# Patient Record
Sex: FEMALE | Race: WHITE | Employment: FULL TIME | URBAN - METROPOLITAN AREA
[De-identification: names, ages, dates, MRNs, and addresses within clinical notes are randomized per-mention and may not be internally consistent; named-entity substitution may affect disease eponyms.]

---

## 2019-02-26 ENCOUNTER — HOSPITAL ENCOUNTER (OUTPATIENT)
Age: 54
Discharge: HOME | End: 2019-02-26
Payer: COMMERCIAL

## 2019-02-26 ENCOUNTER — HOSPITAL ENCOUNTER (OUTPATIENT)
Dept: GENERAL RADIOLOGY | Age: 54
Discharge: HOME | End: 2019-02-26
Payer: COMMERCIAL

## 2019-02-26 DIAGNOSIS — M25.512 LEFT SHOULDER PAIN, UNSPECIFIED CHRONICITY: ICD-10-CM

## 2019-02-26 PROCEDURE — 73030 X-RAY EXAM OF SHOULDER: CPT

## 2019-04-10 ENCOUNTER — HOSPITAL ENCOUNTER (OUTPATIENT)
Dept: MRI IMAGING | Age: 54
Discharge: HOME | End: 2019-04-10
Payer: COMMERCIAL

## 2019-04-10 DIAGNOSIS — R29.898 LEFT HAND WEAKNESS: ICD-10-CM

## 2019-04-10 PROCEDURE — 73221 MRI JOINT UPR EXTREM W/O DYE: CPT

## 2019-04-15 ENCOUNTER — OFFICE VISIT (OUTPATIENT)
Dept: ORTHOPEDIC SURGERY | Age: 54
End: 2019-04-15
Payer: COMMERCIAL

## 2019-04-15 VITALS
DIASTOLIC BLOOD PRESSURE: 83 MMHG | HEART RATE: 85 BPM | SYSTOLIC BLOOD PRESSURE: 123 MMHG | HEIGHT: 66 IN | BODY MASS INDEX: 29.73 KG/M2 | WEIGHT: 184.97 LBS

## 2019-04-15 DIAGNOSIS — M75.82 TENDINITIS OF LEFT ROTATOR CUFF: ICD-10-CM

## 2019-04-15 DIAGNOSIS — M79.18 CERVICAL MYOFASCIAL PAIN SYNDROME: ICD-10-CM

## 2019-04-15 DIAGNOSIS — M25.512 ACUTE PAIN OF LEFT SHOULDER: ICD-10-CM

## 2019-04-15 DIAGNOSIS — M54.2 NECK PAIN: Primary | ICD-10-CM

## 2019-04-15 DIAGNOSIS — R20.0 LEFT ARM NUMBNESS: ICD-10-CM

## 2019-04-15 DIAGNOSIS — M54.2 CERVICAL SPINE PAIN: ICD-10-CM

## 2019-04-15 PROCEDURE — 20610 DRAIN/INJ JOINT/BURSA W/O US: CPT | Performed by: FAMILY MEDICINE

## 2019-04-15 PROCEDURE — 99203 OFFICE O/P NEW LOW 30 MIN: CPT | Performed by: FAMILY MEDICINE

## 2019-04-15 PROCEDURE — G8427 DOCREV CUR MEDS BY ELIG CLIN: HCPCS | Performed by: FAMILY MEDICINE

## 2019-04-15 PROCEDURE — 3017F COLORECTAL CA SCREEN DOC REV: CPT | Performed by: FAMILY MEDICINE

## 2019-04-15 PROCEDURE — 1036F TOBACCO NON-USER: CPT | Performed by: FAMILY MEDICINE

## 2019-04-15 PROCEDURE — G8419 CALC BMI OUT NRM PARAM NOF/U: HCPCS | Performed by: FAMILY MEDICINE

## 2019-04-15 RX ORDER — OXYBUTYNIN CHLORIDE 5 MG/1
TABLET ORAL
COMMUNITY
Start: 2019-03-04

## 2019-04-15 RX ORDER — DICLOFENAC SODIUM 75 MG/1
75 TABLET, DELAYED RELEASE ORAL 2 TIMES DAILY WITH MEALS
Qty: 60 TABLET | Refills: 3 | Status: SHIPPED | OUTPATIENT
Start: 2019-04-15

## 2019-04-15 RX ORDER — BETAMETHASONE SODIUM PHOSPHATE AND BETAMETHASONE ACETATE 3; 3 MG/ML; MG/ML
12 INJECTION, SUSPENSION INTRA-ARTICULAR; INTRALESIONAL; INTRAMUSCULAR; SOFT TISSUE ONCE
Status: COMPLETED | OUTPATIENT
Start: 2019-04-15 | End: 2019-04-15

## 2019-04-15 RX ORDER — TRAMADOL HYDROCHLORIDE 50 MG/1
50 TABLET ORAL EVERY 6 HOURS PRN
Qty: 28 TABLET | Refills: 0 | Status: SHIPPED | OUTPATIENT
Start: 2019-04-15 | End: 2019-04-22

## 2019-04-15 RX ORDER — METHYLPREDNISOLONE 4 MG/1
TABLET ORAL
Qty: 21 KIT | Refills: 0 | Status: SHIPPED | OUTPATIENT
Start: 2019-04-15 | End: 2019-05-16 | Stop reason: ALTCHOICE

## 2019-04-15 RX ORDER — CYCLOBENZAPRINE HCL 5 MG
TABLET ORAL
COMMUNITY
Start: 2019-02-26

## 2019-04-15 RX ADMIN — BETAMETHASONE SODIUM PHOSPHATE AND BETAMETHASONE ACETATE 12 MG: 3; 3 INJECTION, SUSPENSION INTRA-ARTICULAR; INTRALESIONAL; INTRAMUSCULAR; SOFT TISSUE at 13:45

## 2019-04-15 NOTE — PROGRESS NOTES
Chief Complaint  Shoulder Pain (NP LEFT SHOULDER)      History of Present Illness:  Kyle Su is a 52 y.o. female ***     New Patient:       Patient is being seen today for {OSE ACUTE CHRONIC:} {body parts:114601} pain. The patient reports on ***. Patient reports pain as {Improving/worsening/no change:00759}. Currently pain is a {NUMBER 1-10:5320531244} out of 10 on the pain scale. This problem has been an issue for {#:35071} {DAYS:}. The problem is worse {Time; morning/afternoon/evenin} or is constant or is associated with ***. Patient has other associated symptoms: ***. Patient has attempted {BACK PAIN PREVIOUS TREATMENT:} to treat this problem and symptoms are {Improving/worsening/no change:37428}. Patient {HAS/DOES NOT HAVE:} a previous history ***.               Past Medical History:   Diagnosis Date    Acute UTI        Past Surgical History:   Procedure Laterality Date    ECTOPIC PREGNANCY SURGERY      TONSILLECTOMY         Social History     Socioeconomic History    Marital status: Single     Spouse name: Not on file    Number of children: Not on file    Years of education: Not on file    Highest education level: Not on file   Occupational History    Not on file   Social Needs    Financial resource strain: Not on file    Food insecurity:     Worry: Not on file     Inability: Not on file    Transportation needs:     Medical: Not on file     Non-medical: Not on file   Tobacco Use    Smoking status: Never Smoker    Smokeless tobacco: Never Used   Substance and Sexual Activity    Alcohol use: No    Drug use: No    Sexual activity: Not on file   Lifestyle    Physical activity:     Days per week: Not on file     Minutes per session: Not on file    Stress: Not on file   Relationships    Social connections:     Talks on phone: Not on file     Gets together: Not on file     Attends Holiness service: Not on file     Active member of club or organization: Not on file Attends meetings of clubs or organizations: Not on file     Relationship status: Not on file    Intimate partner violence:     Fear of current or ex partner: Not on file     Emotionally abused: Not on file     Physically abused: Not on file     Forced sexual activity: Not on file   Other Topics Concern    Not on file   Social History Narrative    Not on file        Family History   Problem Relation Age of Onset    Cancer Father     Cancer Mother     Cancer Sister        Allergies   Allergen Reactions    Amoxicillin Hives    Penicillins Hives       Review of Systems  Pertinent items are noted in HPI  Review of systems reviewed from Patient History Form dated on *** and available in the patient's chart under the Media tab. Vital Signs     Ht 5' 5.98\" (1.676 m)   Wt 184 lb 15.5 oz (83.9 kg)   LMP 03/27/2013   BMI 29.87 kg/m²     Current Outpatient Medications   Medication Sig Dispense Refill    fluticasone (FLONASE) 50 MCG/ACT nasal spray 1 spray by Nasal route daily      albuterol sulfate  (90 Base) MCG/ACT inhaler Inhale 2 puffs into the lungs every 6 hours as needed for Wheezing      loratadine (CLARITIN) 10 MG tablet Take 1 tablet by mouth daily 30 tablet 0    tamsulosin (FLOMAX) 0.4 MG capsule Take 0.4 mg by mouth daily. No current facility-administered medications for this visit. General Exam:   Constitutional: Patient is adequately groomed with no evidence of malnutrition  DTRs: Deep tendon reflexes are intact  Mental Status: The patient is oriented to time, place and person. The patient's mood and affect are appropriate. Lymphatic: The lymphatic examination bilaterally reveals all areas to be without enlargement or induration. Vascular: Examination reveals no swelling or calf tenderness. Peripheral pulses are palpable and 2+. Neurological: The patient has good coordination. There is no weakness or sensory deficit.       *** Examination    Inspection: ***    Palpation:  ***    Rang of Motion:  ***    Strength:  ***    Special Tests:  ***    Skin: There are no rashes, ulcerations or lesions. Distal motor sensory and vascular exam is intact. Gait: Fluid smooth gait       Reflex symmetrically preserved      Additional Comments:             Additional Examinations:     {Additional Exams:37835}        Diagnostic Test Findings:    X-rays obtained and reviewed in office:  Views ***  Location  {RIGHT LEFT BILATERAL:18018}  ***  Impression ***    Assessment & Plan:    There are no diagnoses linked to this encounter. Treatment Plan:  Treatment options were discussed withBhumi Rodriguez. ***        This dictation was performed with a verbal recognition program (DRAGON) and it was checked for errors. It is possible that there are still dictated errors within this office note. If so, please bring any errors to my attention for an addendum. All efforts were made to ensure that this office note is accurate.

## 2019-04-15 NOTE — PATIENT INSTRUCTIONS
*If you're currently taking an anti-inflammatory such as Advil, Aleve, Ibuprofen, Diclofenac, Naproxen, Meloxicam, Celebrex, or Relafen, please stop. *Take Medrol as directed on foil side of packaging. *Once you are finished with the medrol, then you may re-start or start your anti-inflammatory (DICLOFENAC) the following day.

## 2019-04-15 NOTE — PROGRESS NOTES
Chief Complaint    Shoulder Pain (NP LEFT SHOULDER) and Neck Pain    Initial consultation left shoulder and cervical pain with left arm achiness with numbness    History of Present Illness:  Aracelis Love is a 52 y.o. 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 Davion Anders who is being seen today upon referral from Alonso Acharya for evaluation of ongoing left shoulder and cervical pain with left arm achiness with numbness and tingling. She states that she has been having the initial onset of pain in early February 2019 Initially to her left sided parascapular region which has since involved the left side of her cervical spine left shoulder and arm. 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 She is no longer working at the . She describes a throbbing aching pain in her shoulder at rest at 3-4 out of 10 with more sharp 7-8/10 pain with active shoulder elevation. She is also having numbness and tingling in the C6 distribution of her left arm which has been present since about mid March 2019. She was evaluated by Alonso Acharya CNP and sent for an MRI of her left shoulder which was obtained at [facility] on 4/10/2019 and showed mild glenohumeral osteoarthritis with mild insertional supraspinatus tendinosis. This did not respond to oral anti-inflammatories with Naprosyn or several sessions of therapy although she has only had 3 sessions of PT and is unable to perform her home-based exercises. She is also having difficulty sleeping with left arm numbness and tingling in this have soreness about the cervical spine as well. She is not it had cervical imaging and most for therapy did not involve her cervical spine. She is being seen today for orthopedic and sports consultation with review of her imaging.       Medical History  Patient's medications, allergies, past medical, surgical, social and family histories were reviewed and updated as appropriate. Review of Systems  Relevant review of systems reviewed on 4/15/2019 and available in the patient's chart under the medial tab. Vital Signs  Vitals:    04/15/19 1259   BP: 123/83   Pulse: 85         General Exam:   Constitutional: Patient is adequately groomed with no evidence of malnutrition  DTRs: Deep tendon reflexes are intact  Mental Status: The patient is oriented to time, place and person. The patient's mood and affect are appropriate. Lymphatic: The lymphatic examination bilaterally reveals all areas to be without enlargement or induration. Vascular: Examination reveals no swelling or calf tenderness. Peripheral pulses are palpable and 2+. Neurological: The patient has good coordination. There is no weakness or sensory deficit. Shoulder Examination    Inspection:  There is no high-grade deformity atrophy or soft tissue swelling. She does have some mild a.c. crepitation. Palpation:  She does have tenderness along the posterior cuff greater tuberosity a.c. joint and biceps tendon. Rang of Motion:  He does have very restricted shoulder motion. She'll only abduct to about 60 and forward flex to about 70. External rotation to 20 and internal rotation is just better than hip pocket. Strength:  She does have give way type weakness 4-5 with supra and infraspinatus testing with pain reproduced at 7 out of 10. Subscap slightly better at 4-4+ out of 5. Special Tests:  She has negative drop testing. Above-mentioned pain with supra and infraspinatus testing. I do not sense high-grade instability although she is very stiff and guarded. Negative speed's and labral testing. She does have pain with axial load testing and an equivocal left-sided Spurling's. Skin: There are no rashes, ulcerations or lesions.   Distal motor sensory and vascular exam is intact. Gait: Fluid smooth gait. Reflexes:  Symmetrically preserved. Additional Comments: Cervical spine evaluation does reveal limitations in cervical motion. She does have pain with flexion beyond 60 and extension of -10. Definite pain with axial loading and questionably positive left Spurling's. I do not sense a focal upper extremity motor deficits. DTRs relatively preserved. Equivocal Tinel's carpal tunnel left wrist.  Negative cubital tunnels. Additional Examinations:  Contralateral Exam: Examination of the right shoulder reveals no atrophy or deformity. The skin is warm and dry. Range of motion is within normal limits. There is no focal tenderness with palpation. No AC joint tenderness. Negative Neer's and Palacio-Fitz exams. Strength is graded 5/5 throughout. Right Upper Extremity:  Examination of the right upper extremity does not show any tenderness, deformity or injury. Range of motion is unremarkable. There is no gross instability. There are no rashes, ulcerations or lesions. Strength and tone are normal.  Left Upper Extremity: Examination of the left upper extremity does not show any tenderness, deformity or injury. Range of motion is unremarkable. There is no gross instability. There are no rashes, ulcerations or lesions.   Strength and tone are normal.         Diagnostic Test Findings:   Left shoulder MRI obtained 4/10/2019 as listed above     Narrative   EXAMINATION:   MRI OF THE LEFT SHOULDER WITHOUT CONTRAST   4/10/2019 10:15 am       TECHNIQUE:   Multiplanar multisequence MRI of the left shoulder was performed without the   administration of intravenous contrast.       COMPARISON:   None.       HISTORY:   ORDERING SYSTEM PROVIDED HISTORY: Left hand weakness   TECHNOLOGIST PROVIDED HISTORY:   Ordering Physician Provided Reason for Exam: left posterior shoulder pain   radiating to anterior aspect and down left upper extremity to hand x2 months   Acuity: Chronic   Type of Exam: Initial       FINDINGS:   ROTATOR CUFF: Mild insertional supraspinatus tendinosis.  No tear. Infraspinatus, teres minor, and subscapularis are unremarkable.  No muscular   atrophy.       BICEPS TENDON: The longhead biceps tendon is intact.  No evidence of   tendinosis.  The biceps pulley and anchor are intact.       LABRUM: Inferior labrum appears frayed and somewhat irregular which is likely   related to chronic degeneration.       GLENOHUMERAL JOINT: No joint effusion.  No intra-articular body.  Mild   chondral loss at the glenohumeral joint.       AC JOINT AND ACROMIOCLAVICULAR ARCH: No significant degenerative changes.       BONE MARROW: No acute fracture.  No marrow replacing lesion.       OUTLET SPACES: The suprascapular notch and quadrilateral space are without   obstructing or space occupying lesions.           Impression   Mild insertional supraspinatus tendinosis.       Mild glenohumeral chondral loss.         AP and lateral cervical spine films were obtained today in the office and do show mild loss of cervical lordosis but there does not appear to be substantial degenerative changes and no evidence of acute fracture or spondylolisthesis. Assessment:  #1.  2+ months status post a symptomatic left shoulder rotator cuff tendinopathy with suspected capsulitis and clinical impingement. #2.  4+ weeks status post worsening cervical pain with left arm achiness and numbness with possible occult left radiculopathy         Impression:    Encounter Diagnoses   Name Primary?     Neck pain Yes    Cervical spine pain     Cervical myofascial pain syndrome     Left arm numbness     Acute pain of left shoulder     Tendinitis of left rotator cuff        Office Procedures:     Orders Placed This Encounter   Procedures    XR CERVICAL SPINE (2-3 VIEWS)     Standing Status:   Future     Number of Occurrences:   1     Standing Expiration Date:   4/15/2020     Order Specific Question: Reason for exam:     Answer:   pain    MRI CERVICAL SPINE WO CONTRAST     Scheduling Instructions:      [facility] [address], [place] #:      TIME AND DATE TBD      PLEASE CALL PATIENT ONCE APPROVED TO SCHEDULE             Remember that it may take several business days to pre-cert your MRI through your insurance. Our office will contact you as soon as we have the approval.             We will not give any test results over the phone. Please call [phone] once you have your test day and time to schedule a follow up with Dr. Balbir March. Order Specific Question:   Reason for exam:     Answer:   R/O C5-6, C6-7 HNP, FORAMINAL STENOSIS    [facility]     Referral Priority:   Routine     Referral Type:   Eval and Treat     Referral Reason:   Specialty Services Required     Requested Specialty:   Physical Therapy     Number of Visits Requested:   1    CA ARTHROCENTESIS ASPIR&/INJ MAJOR JT/BURSA W/O US       Treatment Plan:  Treatment options were discussed with Blake Shoemaker today. While I do think her shoulder is a pain generator concerned about her arm numbness and cervical symptoms and would like for her to have cervical MRI to rule out a left-sided C5 5 and C6-7 disc herniation and/or foraminal narrowing. We did place her on a Medrol Dosepak followed by changing her to diclofenac 75 mg 1 pill twice daily and temporarily gave her tramadol for breakthrough pain. We did opt to inject her left shoulder today using 2 mL of Celestone, 4 mL of Marcaine, 3 mL of Xylocaine. I would like for her to fully schedule physical therapy for her left shoulder and cervical spine and I will see her back post imaging. She is not currently working. Icing and activity modification importance performing her home exercise program was discussed.   She will contact us in the interim with questions or concerns we'll see her back post MRI. This dictation was performed with a verbal recognition program (DRAGON) and it was checked for errors. It is possible that there are still dictated errors within this office note. If so, please bring any errors to my attention for an addendum. All efforts were made to ensure that this office note is accurate.

## 2019-04-22 ENCOUNTER — TELEPHONE (OUTPATIENT)
Dept: ORTHOPEDIC SURGERY | Age: 54
End: 2019-04-22

## 2019-04-22 NOTE — TELEPHONE ENCOUNTER
Patient called to check on MRI auth and had questions about when to begin physical therapy. I checked on MRI auths and they are still pending. I advised patient go ahead and schedule physical therapy as that will not interfere with MRI imaging and will help with pain reduction.  Patient was in agreement with this plan and understands our office will contact her once we have gotten approval for MRI scans

## 2019-04-24 ENCOUNTER — TELEPHONE (OUTPATIENT)
Dept: ORTHOPEDIC SURGERY | Age: 54
End: 2019-04-24

## 2019-04-24 ENCOUNTER — HOSPITAL ENCOUNTER (OUTPATIENT)
Dept: PHYSICAL THERAPY | Age: 54
Setting detail: THERAPIES SERIES
Discharge: HOME | End: 2019-04-24
Payer: COMMERCIAL

## 2019-04-24 PROCEDURE — 97140 MANUAL THERAPY 1/> REGIONS: CPT | Performed by: PHYSICAL THERAPIST

## 2019-04-24 PROCEDURE — 97161 PT EVAL LOW COMPLEX 20 MIN: CPT | Performed by: PHYSICAL THERAPIST

## 2019-04-24 PROCEDURE — 97012 MECHANICAL TRACTION THERAPY: CPT | Performed by: PHYSICAL THERAPIST

## 2019-04-24 PROCEDURE — 97110 THERAPEUTIC EXERCISES: CPT | Performed by: PHYSICAL THERAPIST

## 2019-04-24 NOTE — PLAN OF CARE
[facility], [address] [place] [address] [address]  Phone: [phone]   Fax:     [phone]     Physical Therapy Certification    Dear Referring Practitioner: Clotilde Maloney,    We had the pleasure of evaluating the following patient for physical therapy services at [address] [place] [address]. A summary of our findings can be found in the initial assessment below. This includes our plan of care. If you have any questions or concerns regarding these findings, please do not hesitate to contact me at the office phone number checked above. Thank you for the referral.       Physician Signature:_______________________________Date:__________________  By signing above (or electronic signature), therapists plan is approved by physician      Patient: Kyle Su   : 1965   MRN: [id number]  Referring Physician: Referring Practitioner: Clotilde Maloney      Evaluation Date: 2019      Medical Diagnosis Information:  Diagnosis: PT: Cervical pain/ L shoulder pain   Treatment Diagnosis: Cervical pain with radiation into LUE M54.2/ M75.82/ M25.512                                         Insurance information: PT Insurance Information: [employer]    Precautions/ Contra-indications: none  Latex Allergy:  [x]NO      []YES  Preferred Language for Healthcare:   [x]English       []other:    SUBJECTIVE: Patient stated complaint:Patient reports pain started 3 months ago. 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 Currently pain in upper shoulder and into L hand. Reports N/T into L hand. MRI of L shoulder 3/2019 which showed tendinitis of L shoulder. MD wants MRI of neck, but requires PT/ conservative care first. Had PT in the past where they did traction with some relief. RHD.      Relevant Medical History:(-) latex allergy, PM, [place]  Functional Disability Index: PT G-Codes  Functional Assessment Tool Used: NDI  Score: 32% disability    Pain Scale: 9/10  Easing factors: rest, ice, NSAIDs  Provocative factors: end of the day, sleeping on L side, lifting out to side, reaching behind back, looking over either shoulder when driving     Type: [x]Constant   []Intermittent  [x]Radiating []Localized []other:     Numbness/Tingling: N/T into L hand    Occupation/School: Unemployed     Living Status/Prior Level of Function: Independent with ADLs and IADLs, everyday life    OBJECTIVE:     CERV ROM     Cervical Flexion 59    Cervical Extension 24 pain    Cervical SB R 28 pain  L 40    Cervical rotation R 50% pain  L 85%         ROM Left Right   Shoulder Flex 91  97 135   Shoulder Abd     Shoulder ER Top of head  25 at 45 T2   Shoulder IR L PSIS  15 at 45 T6             Strength  Left Right   Shoulder Flex NT    Shoulder Scap     Shoulder ER 3+ 5   Shoulder IR 4- 5             Reflexes Left Right   C5-6 Biceps     C5-6 Brachioradialis     C7-8 Triceps       Reflexes/Sensation:    [x]Dermatomes/Myotomes intact    [x]Reflexes equal and normal bilaterally   []Other:    Joint mobility: NT   []Normal    []Hypo   []Hyper    Palpation: tender L UT    Functional Mobility/Transfers: I with transfers    Posture: arm held in protective position    Bandages/Dressings/Incisions: na    Gait: (include devices/WB status): WNL     Orthopedic Special Tests: Cervical distraction (reported relief)                       [x] Patient history, allergies, meds reviewed. Medical chart reviewed. See intake form. Review Of Systems (ROS):  [x]Performed Review of systems (Integumentary, CardioPulmonary, Neurological) by intake and observation. Intake form has been scanned into medical record. Patient has been instructed to contact their primary care physician regarding ROS issues if not already being addressed at this time.       Co-morbidities/Complexities (which will affect course of rehabilitation):   [x]None           Arthritic conditions   []Rheumatoid functional strength   []other:      Functional Activity Limitations (from functional questionnaire and intake)   []Reduced ability to tolerate prolonged functional positions   []Reduced ability or difficulty with changes of positions or transfers between positions   []Reduced ability to maintain good posture and demonstrate good body mechanics with sitting, bending, and lifting   [x] Reduced ability or tolerance with driving and/or computer work   [x]Reduced ability to perform lifting, reaching, carrying tasks   []Reduced ability to concentrate   [x]Reduced ability to sleep    [x]Reduced ability to tolerate any impact through UE or spine   [x]Reduced ability to ambulate prolonged functional periods/distances   []other:    Participation Restrictions   []Reduced participation in self care activities   [x]Reduced participation in home management activities   [x]Reduced participation in work activities   [x]Reduced participation in social activities. []Reduced participation in sport/recreational activities.     Classification/Subgrouping:   []signs/symptoms consistent with neck pain with mobility deficits     []signs/symptoms consistent with neck pain with movement coordinated impairments    [x]signs/symptoms consistent with neck pain with radiating pain    []signs/symptoms consistent with neck pain with headaches (cervicogenic)    []Signs/symptoms consistent with nerve root involvement including myotome & dermatome dysfunction   []sign/symptoms consistent with facet dysfunction of cervical and thoracic spine    []signs/symptoms consistent suggesting central cord compression/UMN syndromes   []signs/symptoms consistent with discogenic cervical pain   []signs/symptoms consistent with rib dysfunction   []signs/symptoms consistent with postural dysfunction   [x]signs/symptoms consistent with shoulder pathology    []signs/symptoms consistent with post-surgical status including decreased ROM, strength and function. []signs/symptoms consistent with pathology which may benefit from Dry Needling   []signs/symptoms which may limit the use of advanced manual therapy techniques: (Hypertension, recent trauma, intolerance to end range positions, prior TIA, visual issues, UE myotomes loss )     Prognosis/Rehab Potential:      []Excellent   [x]Good    []Fair   []Poor    Tolerance of evaluation/treatment:    []Excellent   [x]Good    []Fair   []Poor    Physical Therapy Evaluation Complexity Justification  [x] A history of present problem with:  [] no personal factors and/or comorbidities that impact the plan of care;  [x]1-2 personal factors and/or comorbidities that impact the plan of care  []3 personal factors and/or comorbidities that impact the plan of care  [x] An examination of body systems using standardized tests and measures addressing any of the following: body structures and functions (impairments), activity limitations, and/or participation restrictions;:  [] a total of 1-2 or more elements   [] a total of 3 or more elements   [x] a total of 4 or more elements   [x] A clinica l presentation with:  [x] stable and/or uncomplicated characteristics   [] evolving clinical presentation with changing characteristics  [] unstable and unpredictable characteristics;   [x] Clinical decision making of [x] low, [] moderate, [] high complexity using standardized patient assessment instrument and/or measurable assessment of functional outcome. [x] EVAL (LOW) 50325 (typically 20 minutes face-to-face)  [] EVAL (MOD) 59824 (typically 30 minutes face-to-face)  [] EVAL (HIGH) 92184 (typically 45 minutes face-to-face)  [] RE-EVAL     PLAN:   Frequency/Duration:  2 days per week for 8 Weeks:  Interventions:  [x]  Therapeutic exercise including: strength training, ROM, for cervical spine,scapula, core and Upper extremity, including postural re-education.    [x]  NMR activation and proprioception for Deep cervical flexors, periscapular and RC muscles and Core, including postural re-education. [x]  Manual therapy as indicated for C/T spine, ribs, Soft tissue to include: Dry Needling/IASTM, STM, PROM, Gr I-IV mobilizations, manipulation. [x] Modalities as needed that may include: thermal agents, E-stim, Biofeedback, US, iontophoresis as indicated  [x] Patient education on joint protection, postural re-education, activity modification, progression of HEP. HEP instruction: (see scanned forms)    GOALS:  Patient stated goal: Regain use of L shoulder    Therapist goals for Patient:   Short Term Goals: To be achieved in: 2 weeks  1. Independent in HEP and progression per patient tolerance, in order to prevent re-injury. 2. Patient will have a decrease in pain to facilitate improvement in movement, function, and ADLs as indicated by Functional Deficits. Long Term Goals: To be achieved in: 8 weeks  1. Disability index score of 10% or less for the NDI to assist with reaching prior level of function. 2. Patient will demonstrate increased AROM to [facility] of cervical/thoracic spine to allow for proper joint functioning as indicated by patients Functional Deficits. 3. Patient will demonstrate an increase in postural awareness and control and activation of  Deep cervical stabilizers to allow for proper functional mobility as indicated by patients Functional Deficits. 4. Patient will return to full functional activities without increased symptoms or restriction including ability to reach overhead, reach behind back, or look over either shoulder pain free. 5. 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      Electronically signed by:  Jordana Cuello PT

## 2019-04-24 NOTE — FLOWSHEET NOTE
[address]    [place], [address]                Physical Therapy Daily Treatment Note  Date:  2019    Patient Name:  Cruzito Ramírez   :  1965  MRN: [id number]  Restrictions/Precautions:    Medical/Treatment Diagnosis Information:  · Diagnosis: PT: Cervical pain/ L shoulder pain  · Treatment Diagnosis: Cervical pain with radiation into LUE M54.2/ M75.82/ V70.334  Insurance/Certification information:  PT Insurance Information: [facility]  Physician Information:  Referring Practitioner: Gabbi Knight of care signed (Y/N):     Date of Patient follow up with Physician:     G-Code (if applicable):      Date G-Code Applied:    PT G-Codes  Functional Assessment Tool Used: NDI  Score: 32% disability    Progress Note: [x]  Yes  []  No  Next due by: Visit #10      Latex Allergy:  [x]NO      []YES  Preferred Language for Healthcare:   [x]English       []other:    Visit # Insurance Allowable   1 30     Pain level:  9/10     SUBJECTIVE:  See eval    OBJECTIVE: See eval  Observation:   Test measurements:      RESTRICTIONS/PRECAUTIONS: none    Exercises/Interventions:   Therapeutic Ex Sets/sec Notes HEP   Supine cane flex  Supine cane ER x10  x10   45 ABD    TB row/ ext  TB IR/ ER x10 Yellow  x10 Yellow                                                                                                           Manual Intervention      PROM L shoulder, manual traction (reported relief) 10'                                         NMR re-education                                                          Therapeutic Exercise and NMR EXR  [] (02061) Provided verbal/tactile cueing for activities related to strengthening, flexibility, endurance, ROM  for improvements in scapular, scapulothoracic and UE control with self care, reaching, carrying, lifting, house/yardwork, driving/computer work.    [] (08153) Provided verbal/tactile cueing for activities related to improving balance, coordination, kinesthetic sense, posture, motor skill, proprioception  to assist with  scapular, scapulothoracic and UE control with self care, reaching, carrying, lifting, house/yardwork, driving/computer work. Therapeutic Activities:    [] (78895 or 20398) Provided verbal/tactile cueing for activities related to improving balance, coordination, kinesthetic sense, posture, motor skill, proprioception and motor activation to allow for proper function of scapular, scapulothoracic and UE control with self care, carrying, lifting, driving/computer work. Home Exercise Program:    [x] (89894) Reviewed/Progressed HEP activities related to strengthening, flexibility, endurance, ROM of scapular, scapulothoracic and UE control with self care, reaching, carrying, lifting, house/yardwork, driving/computer work  [] (69279) Reviewed/Progressed HEP activities related to improving balance, coordination, kinesthetic sense, posture, motor skill, proprioception of scapular, scapulothoracic and UE control with self care, reaching, carrying, lifting, house/yardwork, driving/computer work      Manual Treatments:  PROM / STM / Oscillations-Mobs:  G-I, II, III, IV (PA's, Inf., Post.)  [] (81866) Provided manual therapy to mobilize soft tissue/joints of cervical/CT, scapular GHJ and UE for the purpose of modulating pain, promoting relaxation,  increasing ROM, reducing/eliminating soft tissue swelling/inflammation/restriction, improving soft tissue extensibility and allowing for proper ROM for normal function with self care, reaching, carrying, lifting, house/yardwork, driving/computer work    Modalities:  Mechanical Cervical Traction: With the patient lying in hook-lying position holding shut-off switch the traction unit was pre-set at 12 lbs./ 4lbs of on/off cycle. The on/off time was pre-set at 30 seconds / 10 seconds.   The traction unit was set at a duration of 10 minutes. The target goal of modality is to relieve intradiscal pressure and reduce radicular symptoms. Charges:  Timed Code Treatment Minutes: 40   Total Treatment Minutes: 65     [x] EVAL LOW 01856  [x] EA(61747) x      [] IONTO  [] NMR (62035) x      [] VASO  [x] Manual (16875) x       [] Other:  [] TA x       [x] Mech Traction (63178)  [] ES(attended) (79725)      [] ES (un) (43589):     GOALS:  Patient stated goal: Regain use of L shoulder    Therapist goals for Patient:   Short Term Goals: To be achieved in: 2 weeks  1. Independent in HEP and progression per patient tolerance, in order to prevent re-injury. 2. Patient will have a decrease in pain to facilitate improvement in movement, function, and ADLs as indicated by Functional Deficits. Long Term Goals: To be achieved in: 8 weeks  1. Disability index score of 10% or less for the NDI to assist with reaching prior level of function. 2. Patient will demonstrate increased AROM to [facility] of cervical/thoracic spine to allow for proper joint functioning as indicated by patients Functional Deficits. 3. Patient will demonstrate an increase in postural awareness and control and activation of  Deep cervical stabilizers to allow for proper functional mobility as indicated by patients Functional Deficits. 4. Patient will return to full functional activities without increased symptoms or restriction including ability to reach overhead, reach behind back, or look over either shoulder pain free. 5. 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 Progression Towards Functional goals:  [] Patient is progressing as expected towards functional goals listed. [] Progression is slowed due to complexities listed. [] Progression has been slowed due to co-morbidities.   [x] Plan just implemented, too soon to assess goals progression  [] Other:     ASSESSMENT:  See eval    Treatment/Activity Tolerance:  [] Patient tolerated treatment well [] Patient limited by fatique  [] Patient limited by pain  [] Patient limited by other medical complications  [] Other:     Prognosis: [] Good [] Fair  [] Poor    Patient Requires Follow-up: [x] Yes  [] No    PLAN: See eval  [] Continue per plan of care [] Alter current plan (see comments)  [x] Plan of care initiated [] Hold pending MD visit [] Discharge    Electronically signed by: Sanjuana Duke PT

## 2019-04-29 ENCOUNTER — HOSPITAL ENCOUNTER (OUTPATIENT)
Dept: PHYSICAL THERAPY | Age: 54
Setting detail: THERAPIES SERIES
Discharge: HOME | End: 2019-04-29
Payer: COMMERCIAL

## 2019-04-29 PROCEDURE — 97140 MANUAL THERAPY 1/> REGIONS: CPT

## 2019-04-29 PROCEDURE — 97012 MECHANICAL TRACTION THERAPY: CPT

## 2019-04-29 PROCEDURE — 97110 THERAPEUTIC EXERCISES: CPT

## 2019-04-29 NOTE — FLOWSHEET NOTE
[address] [place], [address]                Physical Therapy Daily Treatment Note  Date:  2019    Patient Name:  Quentin Gottron   :  1965  MRN: [id number]  Restrictions/Precautions:    Medical/Treatment Diagnosis Information:  · Diagnosis: PT: Cervical pain/ L shoulder pain  · Treatment Diagnosis: Cervical pain with radiation into LUE M54.2/ M75.82/ P36.296  Insurance/Certification information:  PT Insurance Information: [facility]  Physician Information:  Referring Practitioner: Giancarlo Miner of care signed (Y/N):     Date of Patient follow up with Physician:     G-Code (if applicable):      Date G-Code Applied:         Progress Note: [x]  Yes  []  No  Next due by: Visit #10      Latex Allergy:  [x]NO      []YES  Preferred Language for Healthcare:   [x]English       []other:    Visit # Insurance Allowable   2 30     Pain level:  9/10     SUBJECTIVE:  Felt good after traction last session, stated pain returned over the weekend. Feels a little better, wants to try mechanical traction.      OBJECTIVE: See eval  Observation:   Test measurements:  Shoulder flex PROM: 112  Shoulder ER PROM: 25 @ PROM  19    RESTRICTIONS/PRECAUTIONS: none    Exercises/Interventions:   Therapeutic Ex Sets/sec Notes HEP   Supine cane flex  Supine cane ER  Wall slides flex 2x10  x10  x10 Cues for form  45 ABD, cues for form    TB row/ ext  TB IR/ ER x24 Yellow  x20 Yellow Cues for form  Cues for form                                                                                                          Manual Intervention      PROM L shoulder, grade I/II mobs   (reported relief) 8'                                         NMR re-education                                                          Therapeutic Exercise and NMR EXR  [x] (76503) Provided verbal/tactile cueing for activities related to strengthening, flexibility, endurance, ROM  for improvements in scapular, scapulothoracic and UE control with self care, reaching, carrying, lifting, house/yardwork, driving/computer work.    [] (70379) Provided verbal/tactile cueing for activities related to improving balance, coordination, kinesthetic sense, posture, motor skill, proprioception  to assist with  scapular, scapulothoracic and UE control with self care, reaching, carrying, lifting, house/yardwork, driving/computer work. Therapeutic Activities:    [] (43247 or 68988) Provided verbal/tactile cueing for activities related to improving balance, coordination, kinesthetic sense, posture, motor skill, proprioception and motor activation to allow for proper function of scapular, scapulothoracic and UE control with self care, carrying, lifting, driving/computer work.      Home Exercise Program:    [x] (40300) Reviewed/Progressed HEP activities related to strengthening, flexibility, endurance, ROM of scapular, scapulothoracic and UE control with self care, reaching, carrying, lifting, house/yardwork, driving/computer work  [] (06937) Reviewed/Progressed HEP activities related to improving balance, coordination, kinesthetic sense, posture, motor skill, proprioception of scapular, scapulothoracic and UE control with self care, reaching, carrying, lifting, house/yardwork, driving/computer work      Manual Treatments:  PROM / STM / Oscillations-Mobs:  G-I, II, III, IV (PA's, Inf., Post.)  [x] (35978) Provided manual therapy to mobilize soft tissue/joints of cervical/CT, scapular GHJ and UE for the purpose of modulating pain, promoting relaxation,  increasing ROM, reducing/eliminating soft tissue swelling/inflammation/restriction, improving soft tissue extensibility and allowing for proper ROM for normal function with self care, reaching, carrying, lifting, house/yardwork, driving/computer work    Modalities:  Mechanical Cervical Traction: With the patient lying in hook-lying position holding shut-off switch the traction unit was pre-set at 14 lbs. / 6lbs of on/off cycle. The on/off time was pre-set at 30 seconds / 10 seconds. The traction unit was set at a duration of 10 minutes. The target goal of modality is to relieve intradiscal pressure and reduce radicular symptoms. Charges:  Timed Code Treatment Minutes: 30   Total Treatment Minutes: 40     [] EVAL LOW 24715  [x] OU(15618) x      [] IONTO  [] NMR (08930) x      [] VASO  [x] Manual (56803) x       [] Other:  [] TA x       [x] Mech Traction (74330)  [] ES(attended) (08469)      [] ES (un) (68200):     GOALS:  Patient stated goal: Regain use of L shoulder    Therapist goals for Patient:   Short Term Goals: To be achieved in: 2 weeks  1. Independent in HEP and progression per patient tolerance, in order to prevent re-injury. 2. Patient will have a decrease in pain to facilitate improvement in movement, function, and ADLs as indicated by Functional Deficits. Long Term Goals: To be achieved in: 8 weeks  1. Disability index score of 10% or less for the NDI to assist with reaching prior level of function. 2. Patient will demonstrate increased AROM to [facility] of cervical/thoracic spine to allow for proper joint functioning as indicated by patients Functional Deficits. 3. Patient will demonstrate an increase in postural awareness and control and activation of  Deep cervical stabilizers to allow for proper functional mobility as indicated by patients Functional Deficits. 4. Patient will return to full functional activities without increased symptoms or restriction including ability to reach overhead, reach behind back, or look over either shoulder pain free. 5. 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 Progression Towards Functional goals:  [x] Patient is progressing as expected towards functional goals listed.     [] Progression is slowed due to complexities listed. [] Progression has been slowed due to co-morbidities. [] Plan just implemented, too soon to assess goals progression  [] Other:     ASSESSMENT:   Pt reported relief and demos increased cervical rotation left after traction. Limited tolerance of PROM shoulder 2/2 report of pain, very guarded with PROM. Improvement with self stretches. Increased reps with TB ex, added wall slides to HEP with good understanding.     Treatment/Activity Tolerance:  [x] Patient tolerated treatment well [] Patient limited by fatique  [] Patient limited by pain  [] Patient limited by other medical complications  [] Other:     Prognosis: [x] Good [] Fair  [] Poor    Patient Requires Follow-up: [x] Yes  [] No    PLAN: See eval  [x] Continue per plan of care [] Alter current plan (see comments)  [] Plan of care initiated [] Hold pending MD visit [] Discharge    Electronically signed by: Tammie May PT, DPT

## 2019-05-02 ENCOUNTER — HOSPITAL ENCOUNTER (OUTPATIENT)
Dept: PHYSICAL THERAPY | Age: 54
Setting detail: THERAPIES SERIES
Discharge: HOME | End: 2019-05-02
Payer: COMMERCIAL

## 2019-05-02 PROCEDURE — 97140 MANUAL THERAPY 1/> REGIONS: CPT | Performed by: PHYSICAL THERAPIST

## 2019-05-02 PROCEDURE — 97012 MECHANICAL TRACTION THERAPY: CPT | Performed by: PHYSICAL THERAPIST

## 2019-05-02 PROCEDURE — 97110 THERAPEUTIC EXERCISES: CPT | Performed by: PHYSICAL THERAPIST

## 2019-05-02 NOTE — FLOWSHEET NOTE
[address] [place], [address]                Physical Therapy Daily Treatment Note  Date:  2019    Patient Name:  Davis Johnston   :  1965  MRN: [id number]  Restrictions/Precautions:    Medical/Treatment Diagnosis Information:  · Diagnosis: PT: Cervical pain/ L shoulder pain  · Treatment Diagnosis: Cervical pain with radiation into LUE M54.2/ M75.82/ U31.673  Insurance/Certification information:  PT Insurance Information: [facility]  Physician Information:  Referring Practitioner: John Pick of care signed (Y/N):     Date of Patient follow up with Physician:     G-Code (if applicable):      Date G-Code Applied:         Progress Note: [x]  Yes  []  No  Next due by: Visit #10      Latex Allergy:  [x]NO      []YES  Preferred Language for Healthcare:   [x]English       []other:    Visit # Insurance Allowable   3 30     Pain level:  10/10     SUBJECTIVE:  Continues to feel better, states she feels improvement for 3-4 days following PT session. States she is currently in 10/10 pain as she could not sleep last night with shoulder and neck pain.     OBJECTIVE: See eval  Observation:   Test measurements:  L Shoulder flex PROM: 112  Shoulder ER PROM: 25 @ 45 19    RESTRICTIONS/PRECAUTIONS: none    Exercises/Interventions:   Therapeutic Ex Sets/sec Notes HEP   Supine cane flex  Supine cane ER  Wall slides flex 2x10  x10  x10 Cues for form  45 ABD, cues for form    TB row/ ext  TB IR/ ER x30 Yellow  x22 Yellow Cues for form  Cues for form    Pulleys 5'     Supine chest press x20                                                                                               Manual Intervention      PROM L shoulder, grade I/II mobs   (reported relief) 10'                                         NMR re-education                                                          Therapeutic Exercise and NMR EXR  [x] (37897) Provided verbal/tactile cueing for activities related to strengthening, flexibility, endurance, ROM  for improvements in scapular, scapulothoracic and UE control with self care, reaching, carrying, lifting, house/yardwork, driving/computer work.    [] (57159) Provided verbal/tactile cueing for activities related to improving balance, coordination, kinesthetic sense, posture, motor skill, proprioception  to assist with  scapular, scapulothoracic and UE control with self care, reaching, carrying, lifting, house/yardwork, driving/computer work. Therapeutic Activities:    [] (81350 or 12783) Provided verbal/tactile cueing for activities related to improving balance, coordination, kinesthetic sense, posture, motor skill, proprioception and motor activation to allow for proper function of scapular, scapulothoracic and UE control with self care, carrying, lifting, driving/computer work.      Home Exercise Program:    [x] (01571) Reviewed/Progressed HEP activities related to strengthening, flexibility, endurance, ROM of scapular, scapulothoracic and UE control with self care, reaching, carrying, lifting, house/yardwork, driving/computer work  [] (69505) Reviewed/Progressed HEP activities related to improving balance, coordination, kinesthetic sense, posture, motor skill, proprioception of scapular, scapulothoracic and UE control with self care, reaching, carrying, lifting, house/yardwork, driving/computer work      Manual Treatments:  PROM / STM / Oscillations-Mobs:  G-I, II, III, IV (PA's, Inf., Post.)  [x] (72161) Provided manual therapy to mobilize soft tissue/joints of cervical/CT, scapular GHJ and UE for the purpose of modulating pain, promoting relaxation,  increasing ROM, reducing/eliminating soft tissue swelling/inflammation/restriction, improving soft tissue extensibility and allowing for proper ROM for normal function with self care, reaching, carrying, lifting, house/yardwork, driving/computer work    Modalities:  Mechanical Cervical Traction: With the patient lying in hook-lying position holding shut-off switch the traction unit was pre-set at 14 lbs. / 6lbs of on/off cycle. The on/off time was pre-set at 30 seconds / 10 seconds. The traction unit was set at a duration of 10 minutes. The target goal of modality is to relieve intradiscal pressure and reduce radicular symptoms. Charges:  Timed Code Treatment Minutes: 30   Total Treatment Minutes: 40     [] EVAL LOW 28137  [x] SR(00335) x      [] IONTO  [] NMR (06046) x      [] VASO  [x] Manual (00282) x       [] Other:  [] TA x       [x] Mech Traction (12016)  [] ES(attended) (26545)      [] ES (un) (13358):     GOALS:  Patient stated goal: Regain use of L shoulder    Therapist goals for Patient:   Short Term Goals: To be achieved in: 2 weeks  1. Independent in HEP and progression per patient tolerance, in order to prevent re-injury. 2. Patient will have a decrease in pain to facilitate improvement in movement, function, and ADLs as indicated by Functional Deficits. Long Term Goals: To be achieved in: 8 weeks  1. Disability index score of 10% or less for the NDI to assist with reaching prior level of function. 2. Patient will demonstrate increased AROM to [facility] of cervical/thoracic spine to allow for proper joint functioning as indicated by patients Functional Deficits. 3. Patient will demonstrate an increase in postural awareness and control and activation of  Deep cervical stabilizers to allow for proper functional mobility as indicated by patients Functional Deficits. 4. Patient will return to full functional activities without increased symptoms or restriction including ability to reach overhead, reach behind back, or look over either shoulder pain free. 5. 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       Progression Towards Functional goals:  [x] Patient is progressing as expected towards functional goals listed. [] Progression is slowed due to complexities listed. [] Progression has been slowed due to co-morbidities. [] Plan just implemented, too soon to assess goals progression  [] Other:     ASSESSMENT:   Muscle guarding with PROM, slowly increasing reps.      Treatment/Activity Tolerance:  [x] Patient tolerated treatment well [] Patient limited by fatique  [] Patient limited by pain  [] Patient limited by other medical complications  [] Other:     Prognosis: [x] Good [] Fair  [] Poor    Patient Requires Follow-up: [x] Yes  [] No    PLAN: See eval  [x] Continue per plan of care [] Alter current plan (see comments)  [] Plan of care initiated [] Hold pending MD visit [] Discharge    Electronically signed by: Juarez Cabrera PT, DPT

## 2019-05-06 ENCOUNTER — HOSPITAL ENCOUNTER (OUTPATIENT)
Dept: PHYSICAL THERAPY | Age: 54
Setting detail: THERAPIES SERIES
Discharge: HOME | End: 2019-05-06
Payer: COMMERCIAL

## 2019-05-06 PROCEDURE — 97140 MANUAL THERAPY 1/> REGIONS: CPT | Performed by: PHYSICAL THERAPIST

## 2019-05-06 PROCEDURE — 97110 THERAPEUTIC EXERCISES: CPT | Performed by: PHYSICAL THERAPIST

## 2019-05-06 PROCEDURE — 97012 MECHANICAL TRACTION THERAPY: CPT | Performed by: PHYSICAL THERAPIST

## 2019-05-06 NOTE — FLOWSHEET NOTE
[address] [place], [address]                Physical Therapy Daily Treatment Note  Date:  2019    Patient Name:  Chela Prado   :  1965  MRN: [id number]  Restrictions/Precautions:    Medical/Treatment Diagnosis Information:  · Diagnosis: PT: Cervical pain/ L shoulder pain  · Treatment Diagnosis: Cervical pain with radiation into LUE M54.2/ M75.82/ O72.434  Insurance/Certification information:  PT Insurance Information: [address] [facility]  Physician Information:  Referring Practitioner: Ashok Carrington of care signed (Y/N):     Date of Patient follow up with Physician:     G-Code (if applicable):      Date G-Code Applied:         Progress Note: [x]  Yes  []  No  Next due by: Visit #10      Latex Allergy:  [x]NO      []YES  Preferred Language for Healthcare:   [x]English       []other:    Visit # Insurance Allowable   4 30     Pain level:  10/10     SUBJECTIVE:  Sore yesterday all day, not sure why. Main complaints continues to be pain at night when attempting to sleep, reports she has tried all positions with pillows without relief.     OBJECTIVE: See eval  Observation:   Test measurements:  L Shoulder flex PROM: 112  Shoulder ER PROM: 25 @ 45 19    RESTRICTIONS/PRECAUTIONS: none    Exercises/Interventions:   Therapeutic Ex Sets/sec Notes HEP   Supine cane flex  Supine cane ER  Wall slides flex 2x10  x10  x10 Cues for form  45 ABD, cues for form    TB row/ ext  TB IR/ ER x30 Yellow  x30 Yellow Cues for form  Cues for form    Pulleys 5'     Supine chest press x20     Table slides flex  Table slides ER x10  x10                                                                                         Manual Intervention      PROM L shoulder, grade I/II mobs   (reported relief) 10' Difficulty relaxing into stretch                                        NMR re-education Therapeutic Exercise and NMR EXR  [x] (51331) Provided verbal/tactile cueing for activities related to strengthening, flexibility, endurance, ROM  for improvements in scapular, scapulothoracic and UE control with self care, reaching, carrying, lifting, house/yardwork, driving/computer work.    [] (53458) Provided verbal/tactile cueing for activities related to improving balance, coordination, kinesthetic sense, posture, motor skill, proprioception  to assist with  scapular, scapulothoracic and UE control with self care, reaching, carrying, lifting, house/yardwork, driving/computer work. Therapeutic Activities:    [] (70757 or 99492) Provided verbal/tactile cueing for activities related to improving balance, coordination, kinesthetic sense, posture, motor skill, proprioception and motor activation to allow for proper function of scapular, scapulothoracic and UE control with self care, carrying, lifting, driving/computer work.      Home Exercise Program:    [x] (82762) Reviewed/Progressed HEP activities related to strengthening, flexibility, endurance, ROM of scapular, scapulothoracic and UE control with self care, reaching, carrying, lifting, house/yardwork, driving/computer work  [] (18841) Reviewed/Progressed HEP activities related to improving balance, coordination, kinesthetic sense, posture, motor skill, proprioception of scapular, scapulothoracic and UE control with self care, reaching, carrying, lifting, house/yardwork, driving/computer work      Manual Treatments:  PROM / STM / Oscillations-Mobs:  G-I, II, III, IV (PA's, Inf., Post.)  [x] (61803) Provided manual therapy to mobilize soft tissue/joints of cervical/CT, scapular GHJ and UE for the purpose of modulating pain, promoting relaxation,  increasing ROM, reducing/eliminating soft tissue swelling/inflammation/restriction, improving soft tissue extensibility and allowing for proper ROM for normal function with self care, reaching, carrying, lifting, house/yardwork, driving/computer work    Modalities:  Mechanical Cervical Traction: With the patient lying in hook-lying position holding shut-off switch the traction unit was pre-set at 14 lbs. / 6lbs of on/off cycle. The on/off time was pre-set at 30 seconds / 10 seconds. The traction unit was set at a duration of 10 minutes. The target goal of modality is to relieve intradiscal pressure and reduce radicular symptoms. Charges:  Timed Code Treatment Minutes: 30   Total Treatment Minutes: 40     [] EVAL LOW 74865  [x] AP(72599) x      [] IONTO  [] NMR (85888) x      [] VASO  [x] Manual (55589) x       [] Other:  [] TA x       [x] Mech Traction (87987)  [] ES(attended) (90722)      [] ES (un) (04276):     GOALS:  Patient stated goal: Regain use of L shoulder    Therapist goals for Patient:   Short Term Goals: To be achieved in: 2 weeks  1. Independent in HEP and progression per patient tolerance, in order to prevent re-injury. 2. Patient will have a decrease in pain to facilitate improvement in movement, function, and ADLs as indicated by Functional Deficits. Long Term Goals: To be achieved in: 8 weeks  1. Disability index score of 10% or less for the NDI to assist with reaching prior level of function. 2. Patient will demonstrate increased AROM to [facility] of cervical/thoracic spine to allow for proper joint functioning as indicated by patients Functional Deficits. 3. Patient will demonstrate an increase in postural awareness and control and activation of  Deep cervical stabilizers to allow for proper functional mobility as indicated by patients Functional Deficits. 4. Patient will return to full functional activities without increased symptoms or restriction including ability to reach overhead, reach behind back, or look over either shoulder pain free. 5. 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 Progression Towards Functional goals:  [x] Patient is progressing as expected towards functional goals listed. [] Progression is slowed due to complexities listed. [] Progression has been slowed due to co-morbidities. [] Plan just implemented, too soon to assess goals progression  [] Other:     ASSESSMENT:   Muscle guarding with PROM, slowly increasing reps. Relief with mechanical traction, ROM not progressing as patient with difficulty relaxing with stretches.     Treatment/Activity Tolerance:  [x] Patient tolerated treatment well [] Patient limited by fatique  [] Patient limited by pain  [] Patient limited by other medical complications  [] Other:     Prognosis: [x] Good [] Fair  [] Poor    Patient Requires Follow-up: [x] Yes  [] No    PLAN: See eval  [x] Continue per plan of care [] Alter current plan (see comments)  [] Plan of care initiated [] Hold pending MD visit [] Discharge    Electronically signed by: Alexus Jacome PT, DPT

## 2019-05-09 ENCOUNTER — HOSPITAL ENCOUNTER (OUTPATIENT)
Dept: PHYSICAL THERAPY | Age: 54
Setting detail: THERAPIES SERIES
Discharge: HOME | End: 2019-05-09
Payer: COMMERCIAL

## 2019-05-09 PROCEDURE — 97110 THERAPEUTIC EXERCISES: CPT | Performed by: PHYSICAL THERAPIST

## 2019-05-09 PROCEDURE — 97012 MECHANICAL TRACTION THERAPY: CPT | Performed by: PHYSICAL THERAPIST

## 2019-05-09 PROCEDURE — 97140 MANUAL THERAPY 1/> REGIONS: CPT | Performed by: PHYSICAL THERAPIST

## 2019-05-09 NOTE — FLOWSHEET NOTE
[address], [facility]  [address] [place], [address]                Physical Therapy Daily Treatment Note  Date:  2019    Patient Name:  Davis Johnston   :  1965  MRN: [id number]  Restrictions/Precautions:    Medical/Treatment Diagnosis Information:  · Diagnosis: PT: Cervical pain/ L shoulder pain  · Treatment Diagnosis: Cervical pain with radiation into LUE M54.2/ M75.82/ D69.419  Insurance/Certification information:  PT Insurance Information: [address] [place] [address]  Physician Information:  Referring Practitioner: John Pick of care signed (Y/N):     Date of Patient follow up with Physician:     G-Code (if applicable):      Date G-Code Applied:         Progress Note: [x]  Yes  []  No  Next due by: Visit #10      Latex Allergy:  [x]NO      []YES  Preferred Language for Healthcare:   [x]English       []other:    Visit # Insurance Allowable   5 30     Pain level:  10/10     SUBJECTIVE:  States she messed with pillows last night sleeping which improved symptoms. Reports she has been a little more sore in her shoulders as she was doing housework this week.     OBJECTIVE: See eval  Observation:   Test measurements:  L Shoulder flex PROM:  101  Shoulder ER PROM: 25 @ 45 19    RESTRICTIONS/PRECAUTIONS: none    Exercises/Interventions:   Therapeutic Ex Sets/sec Notes HEP   Supine cane flex  Supine cane ER  Wall slides flex 2x10  x10  x10 Cues for form  45 ABD, cues for form    TB row/ ext  TB IR/ ER x30 Yellow  x30 Yellow Cues for form  Cues for form    Pulleys 5'     Supine chest press x20     Table slides flex  Table slides ER x10  x10                                                                                         Manual Intervention      PROM L shoulder, grade I/II mobs   10' Difficulty relaxing into stretch                                        NMR re-education Therapeutic Exercise and NMR EXR  [x] (32062) Provided verbal/tactile cueing for activities related to strengthening, flexibility, endurance, ROM  for improvements in scapular, scapulothoracic and UE control with self care, reaching, carrying, lifting, house/yardwork, driving/computer work.    [] (51519) Provided verbal/tactile cueing for activities related to improving balance, coordination, kinesthetic sense, posture, motor skill, proprioception  to assist with  scapular, scapulothoracic and UE control with self care, reaching, carrying, lifting, house/yardwork, driving/computer work. Therapeutic Activities:    [] (88606 or 51953) Provided verbal/tactile cueing for activities related to improving balance, coordination, kinesthetic sense, posture, motor skill, proprioception and motor activation to allow for proper function of scapular, scapulothoracic and UE control with self care, carrying, lifting, driving/computer work.      Home Exercise Program:    [x] (06750) Reviewed/Progressed HEP activities related to strengthening, flexibility, endurance, ROM of scapular, scapulothoracic and UE control with self care, reaching, carrying, lifting, house/yardwork, driving/computer work  [] (38330) Reviewed/Progressed HEP activities related to improving balance, coordination, kinesthetic sense, posture, motor skill, proprioception of scapular, scapulothoracic and UE control with self care, reaching, carrying, lifting, house/yardwork, driving/computer work      Manual Treatments:  PROM / STM / Oscillations-Mobs:  G-I, II, III, IV (PA's, Inf., Post.)  [x] (28388) Provided manual therapy to mobilize soft tissue/joints of cervical/CT, scapular GHJ and UE for the purpose of modulating pain, promoting relaxation,  increasing ROM, reducing/eliminating soft tissue swelling/inflammation/restriction, improving soft tissue extensibility and allowing for proper ROM for normal function with self care, reaching, carrying, lifting, house/yardwork, driving/computer work    Modalities:  Mechanical Cervical Traction: With the patient lying in hook-lying position holding shut-off switch the traction unit was pre-set at 14 lbs. / 6lbs of on/off cycle. The on/off time was pre-set at 30 seconds / 10 seconds. The traction unit was set at a duration of 10 minutes. The target goal of modality is to relieve intradiscal pressure and reduce radicular symptoms. Charges:  Timed Code Treatment Minutes: 30   Total Treatment Minutes: 40     [] EVAL LOW 54408  [x] QB(32937) x      [] IONTO  [] NMR (38673) x      [] VASO  [x] Manual (23209) x       [] Other:  [] TA x       [x] Mech Traction (88528)  [] ES(attended) (88563)      [] ES (un) (94973):     GOALS:  Patient stated goal: Regain use of L shoulder    Therapist goals for Patient:   Short Term Goals: To be achieved in: 2 weeks  1. Independent in HEP and progression per patient tolerance, in order to prevent re-injury. 2. Patient will have a decrease in pain to facilitate improvement in movement, function, and ADLs as indicated by Functional Deficits. Long Term Goals: To be achieved in: 8 weeks  1. Disability index score of 10% or less for the NDI to assist with reaching prior level of function. 2. Patient will demonstrate increased AROM to [facility] of cervical/thoracic spine to allow for proper joint functioning as indicated by patients Functional Deficits. 3. Patient will demonstrate an increase in postural awareness and control and activation of  Deep cervical stabilizers to allow for proper functional mobility as indicated by patients Functional Deficits. 4. Patient will return to full functional activities without increased symptoms or restriction including ability to reach overhead, reach behind back, or look over either shoulder pain free. 5. 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 Progression Towards Functional goals:  [x] Patient is progressing as expected towards functional goals listed. [] Progression is slowed due to complexities listed. [] Progression has been slowed due to co-morbidities. [] Plan just implemented, too soon to assess goals progression  [] Other:     ASSESSMENT:   Muscle guarding with PROM, slowly increasing reps. Relief with mechanical traction, ROM not progressing as patient with difficulty relaxing with stretches. Overall increased tightness in ROM secondary to increased pain levels at arrival to therapy. MAX cueing for HEP reproduction.     Treatment/Activity Tolerance:   [x] Patient tolerated treatment well [] Patient limited by fatique  [] Patient limited by pain  [] Patient limited by other medical complications  [] Other:     Prognosis: [x] Good [] Fair  [] Poor    Patient Requires Follow-up: [x] Yes  [] No    PLAN: See eval  [x] Continue per plan of care [] Alter current plan (see comments)  [] Plan of care initiated [] Hold pending MD visit [] Discharge    Electronically signed by: Siobhan Ken PT, DPT

## 2019-05-13 ENCOUNTER — HOSPITAL ENCOUNTER (OUTPATIENT)
Dept: PHYSICAL THERAPY | Age: 54
Setting detail: THERAPIES SERIES
Discharge: HOME | End: 2019-05-13
Payer: COMMERCIAL

## 2019-05-13 PROCEDURE — 97110 THERAPEUTIC EXERCISES: CPT

## 2019-05-13 PROCEDURE — 97012 MECHANICAL TRACTION THERAPY: CPT

## 2019-05-13 NOTE — FLOWSHEET NOTE
[address] [place], [address]                Physical Therapy Daily Treatment Note  Date:  2019    Patient Name:  Ling Mcguire   :  1965  MRN: [id number]  Restrictions/Precautions:    Medical/Treatment Diagnosis Information:  · Diagnosis: PT: Cervical pain/ L shoulder pain  · Treatment Diagnosis: Cervical pain with radiation into LUE M54.2/ M75.82/ G35.810  Insurance/Certification information:  PT Insurance Information: [facility]  Physician Information:  Referring Practitioner: Gentry Nyhan of care signed (Y/N):     Date of Patient follow up with Physician:     G-Code (if applicable):      Date G-Code Applied:         Progress Note: [x]  Yes  []  No  Next due by: Visit #10      Latex Allergy:  [x]NO      []YES  Preferred Language for Healthcare:   [x]English       []other:    Visit # Insurance Allowable   6 30     Pain level:  10/10     SUBJECTIVE: Minimal change in how high she can lift it, feels like she can lift more weight but pain in unchanged.     OBJECTIVE: See eval  Observation:   Test measurements:  L Shoulder flex PROM:  101  Shoulder ER PROM: 25 @ 45 19    RESTRICTIONS/PRECAUTIONS: none    Exercises/Interventions:   Therapeutic Ex Sets/sec Notes HEP   Supine cane flex  Supine cane ER  Wall slides flex 2x10  x10  x10 Cues for form  45 ABD, cues for form    TB row/ ext  TB IR/ ER x30 Red  x30 Red Cues for form  Cues for form    Pulleys 5'     Supine chest press 3x10     Table slides flex  Table slides ER x10  x10                                                                                         Manual Intervention      PROM L shoulder, grade I/II mobs    Difficulty relaxing into stretch                                        NMR re-education                                                          Therapeutic Exercise and NMR EXR  [x] (57252) Provided verbal/tactile cueing for activities related to strengthening, flexibility, endurance, ROM  for improvements in scapular, scapulothoracic and UE control with self care, reaching, carrying, lifting, house/yardwork, driving/computer work.    [] (49335) Provided verbal/tactile cueing for activities related to improving balance, coordination, kinesthetic sense, posture, motor skill, proprioception  to assist with  scapular, scapulothoracic and UE control with self care, reaching, carrying, lifting, house/yardwork, driving/computer work. Therapeutic Activities:    [] (86743 or 42054) Provided verbal/tactile cueing for activities related to improving balance, coordination, kinesthetic sense, posture, motor skill, proprioception and motor activation to allow for proper function of scapular, scapulothoracic and UE control with self care, carrying, lifting, driving/computer work.      Home Exercise Program:    [x] (30708) Reviewed/Progressed HEP activities related to strengthening, flexibility, endurance, ROM of scapular, scapulothoracic and UE control with self care, reaching, carrying, lifting, house/yardwork, driving/computer work  [] (90130) Reviewed/Progressed HEP activities related to improving balance, coordination, kinesthetic sense, posture, motor skill, proprioception of scapular, scapulothoracic and UE control with self care, reaching, carrying, lifting, house/yardwork, driving/computer work      Manual Treatments:  PROM / STM / Oscillations-Mobs:  G-I, II, III, IV (PA's, Inf., Post.)  [x] (37017) Provided manual therapy to mobilize soft tissue/joints of cervical/CT, scapular GHJ and UE for the purpose of modulating pain, promoting relaxation,  increasing ROM, reducing/eliminating soft tissue swelling/inflammation/restriction, improving soft tissue extensibility and allowing for proper ROM for normal function with self care, reaching, carrying, lifting, house/yardwork, driving/computer work    Modalities:  Mechanical Cervical Traction: With the patient lying in hook-lying position holding shut-off switch the traction unit was pre-set at 14 lbs. / 6lbs of on/off cycle. The on/off time was pre-set at 30 seconds / 10 seconds. The traction unit was set at a duration of 10 minutes. The target goal of modality is to relieve intradiscal pressure and reduce radicular symptoms. Charges:  Timed Code Treatment Minutes: 30   Total Treatment Minutes: 40     [] EVAL LOW 64838  [x] FK(56920) x  2   [] IONTO  [] NMR (20826) x      [] VASO  [] Manual (35194) x       [] Other:  [] TA x       [x] Mech Traction (51977)  [] ES(attended) (95174)      [] ES (un) (02514):     GOALS:  Patient stated goal: Regain use of L shoulder    Therapist goals for Patient:   Short Term Goals: To be achieved in: 2 weeks  1. Independent in HEP and progression per patient tolerance, in order to prevent re-injury. 2. Patient will have a decrease in pain to facilitate improvement in movement, function, and ADLs as indicated by Functional Deficits. Long Term Goals: To be achieved in: 8 weeks  1. Disability index score of 10% or less for the NDI to assist with reaching prior level of function. 2. Patient will demonstrate increased AROM to [facility] of cervical/thoracic spine to allow for proper joint functioning as indicated by patients Functional Deficits. 3. Patient will demonstrate an increase in postural awareness and control and activation of  Deep cervical stabilizers to allow for proper functional mobility as indicated by patients Functional Deficits. 4. Patient will return to full functional activities without increased symptoms or restriction including ability to reach overhead, reach behind back, or look over either shoulder pain free. 5. 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       Progression Towards Functional goals:  [x] Patient is progressing as expected towards functional goals listed. [] Progression is slowed due to complexities listed. [] Progression has been slowed due to co-morbidities. [] Plan just implemented, too soon to assess goals progression  [] Other:     ASSESSMENT:   Muscle guarding with PROM, slowly increasing reps. Relief with mechanical traction, ROM not progressing as patient with difficulty relaxing with stretches. Overall increased tightness in ROM secondary to increased pain levels at arrival to therapy. MAX cueing for HEP reproduction. Pt reported will schedule f/u with MD at this time 2/2 lack of progression.      Treatment/Activity Tolerance:   [x] Patient tolerated treatment well [] Patient limited by fatique  [] Patient limited by pain  [] Patient limited by other medical complications  [] Other:     Prognosis: [x] Good [] Fair  [] Poor    Patient Requires Follow-up: [x] Yes  [] No    PLAN: See eval  [x] Continue per plan of care [] Alter current plan (see comments)  [] Plan of care initiated [] Hold pending MD visit [] Discharge    Electronically signed by: Wendy Reyes PT, DPT

## 2019-05-16 ENCOUNTER — OFFICE VISIT (OUTPATIENT)
Dept: ORTHOPEDIC SURGERY | Age: 54
End: 2019-05-16
Payer: COMMERCIAL

## 2019-05-16 ENCOUNTER — HOSPITAL ENCOUNTER (OUTPATIENT)
Dept: PHYSICAL THERAPY | Age: 54
Setting detail: THERAPIES SERIES
Discharge: HOME | End: 2019-05-16
Payer: COMMERCIAL

## 2019-05-16 ENCOUNTER — APPOINTMENT (OUTPATIENT)
Dept: PHYSICAL THERAPY | Age: 54
End: 2019-05-16
Payer: COMMERCIAL

## 2019-05-16 VITALS
HEIGHT: 66 IN | BODY MASS INDEX: 29.73 KG/M2 | WEIGHT: 184.97 LBS | SYSTOLIC BLOOD PRESSURE: 120 MMHG | HEART RATE: 88 BPM | DIASTOLIC BLOOD PRESSURE: 78 MMHG

## 2019-05-16 DIAGNOSIS — M79.18 CERVICAL MYOFASCIAL PAIN SYNDROME: ICD-10-CM

## 2019-05-16 DIAGNOSIS — M75.82 TENDINITIS OF LEFT ROTATOR CUFF: ICD-10-CM

## 2019-05-16 DIAGNOSIS — M25.512 ACUTE PAIN OF LEFT SHOULDER: ICD-10-CM

## 2019-05-16 DIAGNOSIS — M54.2 NECK PAIN: Primary | ICD-10-CM

## 2019-05-16 DIAGNOSIS — R20.0 LEFT ARM NUMBNESS: ICD-10-CM

## 2019-05-16 PROCEDURE — G8427 DOCREV CUR MEDS BY ELIG CLIN: HCPCS | Performed by: FAMILY MEDICINE

## 2019-05-16 PROCEDURE — G8419 CALC BMI OUT NRM PARAM NOF/U: HCPCS | Performed by: FAMILY MEDICINE

## 2019-05-16 PROCEDURE — 1036F TOBACCO NON-USER: CPT | Performed by: FAMILY MEDICINE

## 2019-05-16 PROCEDURE — 99214 OFFICE O/P EST MOD 30 MIN: CPT | Performed by: FAMILY MEDICINE

## 2019-05-16 PROCEDURE — 97110 THERAPEUTIC EXERCISES: CPT | Performed by: PHYSICAL THERAPIST

## 2019-05-16 PROCEDURE — 97140 MANUAL THERAPY 1/> REGIONS: CPT | Performed by: PHYSICAL THERAPIST

## 2019-05-16 PROCEDURE — 97012 MECHANICAL TRACTION THERAPY: CPT | Performed by: PHYSICAL THERAPIST

## 2019-05-16 PROCEDURE — 3017F COLORECTAL CA SCREEN DOC REV: CPT | Performed by: FAMILY MEDICINE

## 2019-05-16 RX ORDER — GABAPENTIN 300 MG/1
CAPSULE ORAL
Qty: 90 CAPSULE | Refills: 0 | Status: SHIPPED | OUTPATIENT
Start: 2019-05-16 | End: 2019-07-01

## 2019-05-16 RX ORDER — ETODOLAC 400 MG/1
400 TABLET, FILM COATED ORAL 2 TIMES DAILY
Qty: 60 TABLET | Refills: 3 | Status: SHIPPED | OUTPATIENT
Start: 2019-05-16 | End: 2020-05-15

## 2019-05-16 RX ORDER — PREDNISONE 20 MG/1
TABLET ORAL
Qty: 20 TABLET | Refills: 0 | Status: SHIPPED | OUTPATIENT
Start: 2019-05-16

## 2019-05-16 NOTE — FLOWSHEET NOTE
[address] [place], [address]                Physical Therapy Daily Treatment Note  Date:  2019    Patient Name:  Saleem Brooks   :  1965  MRN: [id number]  Restrictions/Precautions:    Medical/Treatment Diagnosis Information:  · Diagnosis: PT: Cervical pain/ L shoulder pain  · Treatment Diagnosis: Cervical pain with radiation into LUE M54.2/ M75.82/ G50.508  Insurance/Certification information:  PT Insurance Information: [facility]  Physician Information:  Referring Practitioner: Héctor Houser of dov signed (Y/N):     Date of Patient follow up with Physician:     G-Code (if applicable):      Date G-Code Applied:         Progress Note: [x]  Yes  []  No  Next due by: Visit #10      Latex Allergy:  [x]NO      []YES  Preferred Language for Healthcare:   [x]English       []other:    Visit # Insurance Allowable   7 30     Pain level:  10/10     SUBJECTIVE: Sees MD following this session as her shoulder pain is not improving.     OBJECTIVE: See eval  Observation:   Test measurements:  L Shoulder flex PROM:  108  Shoulder ER PROM: 25 @ 45 19    RESTRICTIONS/PRECAUTIONS: none    Exercises/Interventions:   Therapeutic Ex Sets/sec Notes HEP   Supine cane flex  Supine cane ER  Wall slides flex 2x10  x10  x10 Cues for form  45 ABD, cues for form    TB row/ ext  TB IR/ ER x30 Red  x30 Red Cues for form  Cues for form    Pulleys 5'     Supine chest press 3x10     Table slides flex  Table slides ER x10  x10                                                                                         Manual Intervention      PROM L shoulder, grade I/II mobs   10' Difficulty relaxing into stretch                                        NMR re-education                                                          Therapeutic Exercise and NMR EXR  [x] (11913) Provided verbal/tactile cueing for activities related to strengthening, flexibility, endurance, ROM  for improvements in scapular, scapulothoracic and UE control with self care, reaching, carrying, lifting, house/yardwork, driving/computer work.    [] (35875) Provided verbal/tactile cueing for activities related to improving balance, coordination, kinesthetic sense, posture, motor skill, proprioception  to assist with  scapular, scapulothoracic and UE control with self care, reaching, carrying, lifting, house/yardwork, driving/computer work. Therapeutic Activities:    [] (18703 or 41902) Provided verbal/tactile cueing for activities related to improving balance, coordination, kinesthetic sense, posture, motor skill, proprioception and motor activation to allow for proper function of scapular, scapulothoracic and UE control with self care, carrying, lifting, driving/computer work.      Home Exercise Program:    [x] (09987) Reviewed/Progressed HEP activities related to strengthening, flexibility, endurance, ROM of scapular, scapulothoracic and UE control with self care, reaching, carrying, lifting, house/yardwork, driving/computer work  [] (93759) Reviewed/Progressed HEP activities related to improving balance, coordination, kinesthetic sense, posture, motor skill, proprioception of scapular, scapulothoracic and UE control with self care, reaching, carrying, lifting, house/yardwork, driving/computer work      Manual Treatments:  PROM / STM / Oscillations-Mobs:  G-I, II, III, IV (PA's, Inf., Post.)  [x] (16732) Provided manual therapy to mobilize soft tissue/joints of cervical/CT, scapular GHJ and UE for the purpose of modulating pain, promoting relaxation,  increasing ROM, reducing/eliminating soft tissue swelling/inflammation/restriction, improving soft tissue extensibility and allowing for proper ROM for normal function with self care, reaching, carrying, lifting, house/yardwork, driving/computer work    Modalities:  Mechanical Cervical Traction: With the patient lying in hook-lying position holding shut-off switch the traction unit was pre-set at 14 lbs. / 6lbs of on/off cycle. The on/off time was pre-set at 30 seconds / 10 seconds. The traction unit was set at a duration of 10 minutes. The target goal of modality is to relieve intradiscal pressure and reduce radicular symptoms. Charges:  Timed Code Treatment Minutes: 30   Total Treatment Minutes: 40     [] EVAL LOW 85992  [x] QN(83366) x  1   [] IONTO  [] NMR (95666) x      [] VASO  [x] Manual (58216) x       [] Other:  [] TA x       [x] Mech Traction (64420)  [] ES(attended) (72956)      [] ES (un) (49043):     GOALS:  Patient stated goal: Regain use of L shoulder    Therapist goals for Patient:   Short Term Goals: To be achieved in: 2 weeks  1. Independent in HEP and progression per patient tolerance, in order to prevent re-injury. 2. Patient will have a decrease in pain to facilitate improvement in movement, function, and ADLs as indicated by Functional Deficits. Long Term Goals: To be achieved in: 8 weeks  1. Disability index score of 10% or less for the NDI to assist with reaching prior level of function. 2. Patient will demonstrate increased AROM to [facility] of cervical/thoracic spine to allow for proper joint functioning as indicated by patients Functional Deficits. 3. Patient will demonstrate an increase in postural awareness and control and activation of  Deep cervical stabilizers to allow for proper functional mobility as indicated by patients Functional Deficits. 4. Patient will return to full functional activities without increased symptoms or restriction including ability to reach overhead, reach behind back, or look over either shoulder pain free. 5. 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 Progression Towards Functional goals:  [x] Patient is progressing as expected towards functional goals listed.     []

## 2019-05-16 NOTE — PROGRESS NOTES
Chief Complaint    Neck Pain (CK NECK)    Initial consultation left shoulder and cervical pain with left arm achiness with numbness    History of Present Illness:  Ten Ayala is a 52 y.o. 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 Brittany Marin who is being seen today upon referral from Julia Braun for evaluation of ongoing left shoulder and cervical pain with left arm achiness with numbness and tingling. She states that she has been having the initial onset of pain in early February 2019 Initially to her left sided parascapular region which has since involved the left side of her cervical spine left shoulder and arm. 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 She is no longer working at the . She describes a throbbing aching pain in her shoulder at rest at 3-4 out of 10 with more sharp 7-8/10 pain with active shoulder elevation. She is also having numbness and tingling in the C6 distribution of her left arm which has been present since about mid March 2019. She was evaluated by Julia Braun CNP and sent for an MRI of her left shoulder which was obtained at [facility] on 4/10/2019 and showed mild glenohumeral osteoarthritis with mild insertional supraspinatus tendinosis. This did not respond to oral anti-inflammatories with Naprosyn or several sessions of therapy although she has only had 3 sessions of PT and is unable to perform her home-based exercises. She is also having difficulty sleeping with left arm numbness and tingling in this have soreness about the cervical spine as well. She is not it had cervical imaging and most for therapy did not involve her cervical spine. She is being seen today for orthopedic and sports consultation with review of her imaging.     Ten Ayala was last seen in the office 4/15/2019  for 1. Neck pain    2. Cervical myofascial pain syndrome    3. Left arm numbness    4. Acute pain of left shoulder    5. Tendinitis of left rotator cuff    Patient is now 3 months out from injury onset. Patients symptoms have improved some with regards to the intense pain in her neck. However, she still reports weakness and numbness in her left arm. She also reports cracking and popping in her neck at times. She states that the medrol dose pack temporarily helped for a few days with the numbness/weakness feeling but then came right back within a couple of days of finishing her Medrol pack. She has not been consistent with taking her nsaid -diclofenac - she states she takes it when she really needs it. Patient started with home exercises immediately after seeing us in the office on 4/15/19 and has attended formal physical therapy for a full 6 weeks with little change. She has had 7-8 sessions of formal therapy with ongoing symptoms. She has been compliant with her home-based exercises. She still lacks left shoulder motion with overhead activity as swelling continues to have pain at night. Overall her symptoms have not changed a great deal from her initial evaluation. She is demonstrating functional impairment and does complain of worsening pain with numbness with coughing and sneezing. Patients treatment to date has included rest, ice, NSAID- diclofenac, oral steroid- medrol, physical therapy, home exercises. I have reviewed and agree with the documentation of the HPI documented by my . I will make any changes if necessary. Enc Date: 5/16/2019  Time: 9:53 AM  Provider: Ruperto Cross MD            Medical History  Patient's medications, allergies, past medical, surgical, social and family histories were reviewed and updated as appropriate. Review of Systems  Relevant review of systems reviewed on 4/15/2019 and available in the patient's chart under the medial tab.        Vital does have pain with flexion beyond 60 and extension of -10. Definite pain with axial loading and questionably positive left Spurling's. I do not sense a focal upper extremity motor deficits. DTRs relatively preserved. Equivocal Tinel's carpal tunnel left wrist.  Negative cubital tunnels. Additional Examinations:  Contralateral Exam: Examination of the right shoulder reveals no atrophy or deformity. The skin is warm and dry. Range of motion is within normal limits. There is no focal tenderness with palpation. No AC joint tenderness. Negative Neer's and Palacio-Fitz exams. Strength is graded 5/5 throughout. Right Upper Extremity:  Examination of the right upper extremity does not show any tenderness, deformity or injury. Range of motion is unremarkable. There is no gross instability. There are no rashes, ulcerations or lesions. Strength and tone are normal.  Left Upper Extremity: Examination of the left upper extremity does not show any tenderness, deformity or injury. Range of motion is unremarkable. There is no gross instability. There are no rashes, ulcerations or lesions. Strength and tone are normal.         Diagnostic Test Findings:   Left shoulder MRI obtained 4/10/2019 as listed above     Narrative   EXAMINATION:   MRI OF THE LEFT SHOULDER WITHOUT CONTRAST   4/10/2019 10:15 am       TECHNIQUE:   Multiplanar multisequence MRI of the left shoulder was performed without the   administration of intravenous contrast.       COMPARISON:   None.       HISTORY:   ORDERING SYSTEM PROVIDED HISTORY: Left hand weakness   TECHNOLOGIST PROVIDED HISTORY:   Ordering Physician Provided Reason for Exam: left posterior shoulder pain   radiating to anterior aspect and down left upper extremity to hand x2 months   Acuity: Chronic   Type of Exam: Initial       FINDINGS:   ROTATOR CUFF: Mild insertional supraspinatus tendinosis.  No tear. Infraspinatus, teres minor, and subscapularis are unremarkable.  No muscular   atrophy.       BICEPS TENDON: The longhead biceps tendon is intact.  No evidence of   tendinosis.  The biceps pulley and anchor are intact.       LABRUM: Inferior labrum appears frayed and somewhat irregular which is likely   related to chronic degeneration.       GLENOHUMERAL JOINT: No joint effusion.  No intra-articular body.  Mild   chondral loss at the glenohumeral joint.       AC JOINT AND ACROMIOCLAVICULAR ARCH: No significant degenerative changes.       BONE MARROW: No acute fracture.  No marrow replacing lesion.       OUTLET SPACES: The suprascapular notch and quadrilateral space are without   obstructing or space occupying lesions.           Impression   Mild insertional supraspinatus tendinosis.       Mild glenohumeral chondral loss.             Assessment:  #1.  3+ months status post a symptomatic left shoulder rotator cuff tendinopathy with suspected capsulitis and clinical impingement. #2. 9 weeks status post worsening cervical pain with left arm achiness and numbness with possible occult left radiculopathy         Impression:    Encounter Diagnoses   Name Primary?  Neck pain Yes    Cervical myofascial pain syndrome     Left arm numbness     Acute pain of left shoulder     Tendinitis of left rotator cuff        Office Procedures:     No orders of the defined types were placed in this encounter. Treatment Plan:  Treatment options were discussed with [place] today. While I do think her shoulder is a pain generator, with her ongoing pain symptoms on left arm consistent numbness and tingling I am concerned about her arm numbness and cervical symptoms and would like for her to have cervical MRI to rule out a left-sided C5 5 and C6-7 disc herniation and/or foraminal narrowing. She has had a full of 6 weeks of extensive physical therapy for shoulder neck and her symptoms to remain as well as home exercises.   I would like for her to start a 13 day prednisone taper to be followed by changing her to Lodine 400 mg 1 pill twice daily. We will also start her on the Neurontin taper escalating to 300 mg 3 times daily we will resubmit for her cervical MRI given her lack of improvement with aggressive conservative treatment. She will need to continue with her shoulder rehab as well. She is not currently working. Icing and activity modification was discussed as well as continue compliance for home-based exercises. We will see her back post MRI. Potential need for EMG testing was also discussed. She will contact us with questions in the interim and I will see her back post MRI. This dictation was performed with a verbal recognition program (DRAGON) and it was checked for errors. It is possible that there are still dictated errors within this office note. If so, please bring any errors to my attention for an addendum. All efforts were made to ensure that this office note is accurate.

## 2019-06-06 ENCOUNTER — TELEPHONE (OUTPATIENT)
Dept: ORTHOPEDIC SURGERY | Age: 54
End: 2019-06-06

## 2019-06-06 RX ORDER — PREGABALIN 75 MG/1
75 CAPSULE ORAL 2 TIMES DAILY
Qty: 60 CAPSULE | Refills: 3 | Status: CANCELLED | OUTPATIENT
Start: 2019-06-06 | End: 2019-07-06

## 2019-06-06 NOTE — TELEPHONE ENCOUNTER
CALLED IN DIFFERENT NSAID - RELAFEN AS WELL AS LYRICA 75MG. PATIENT WAS NOT GETTING ANY RELIEF WITH LODINE AND GABAPENTIN WAS CAUSING HER TO GET DIZZY.

## 2019-06-24 ENCOUNTER — TELEPHONE (OUTPATIENT)
Dept: ORTHOPEDIC SURGERY | Age: 54
End: 2019-06-24

## 2019-07-01 ENCOUNTER — OFFICE VISIT (OUTPATIENT)
Dept: ORTHOPEDIC SURGERY | Age: 54
End: 2019-07-01
Payer: COMMERCIAL

## 2019-07-01 VITALS
HEART RATE: 82 BPM | WEIGHT: 184.97 LBS | SYSTOLIC BLOOD PRESSURE: 120 MMHG | BODY MASS INDEX: 29.73 KG/M2 | DIASTOLIC BLOOD PRESSURE: 81 MMHG | HEIGHT: 66 IN

## 2019-07-01 DIAGNOSIS — R20.0 LEFT ARM NUMBNESS: ICD-10-CM

## 2019-07-01 DIAGNOSIS — M25.512 ACUTE PAIN OF LEFT SHOULDER: ICD-10-CM

## 2019-07-01 DIAGNOSIS — M50.20 PROTRUSION OF CERVICAL INTERVERTEBRAL DISC: Primary | ICD-10-CM

## 2019-07-01 DIAGNOSIS — M75.82 TENDINITIS OF LEFT ROTATOR CUFF: ICD-10-CM

## 2019-07-01 DIAGNOSIS — M54.12 LEFT CERVICAL RADICULOPATHY: ICD-10-CM

## 2019-07-01 PROCEDURE — 99214 OFFICE O/P EST MOD 30 MIN: CPT | Performed by: FAMILY MEDICINE

## 2019-07-01 PROCEDURE — G8427 DOCREV CUR MEDS BY ELIG CLIN: HCPCS | Performed by: FAMILY MEDICINE

## 2019-07-01 PROCEDURE — 3017F COLORECTAL CA SCREEN DOC REV: CPT | Performed by: FAMILY MEDICINE

## 2019-07-01 PROCEDURE — 1036F TOBACCO NON-USER: CPT | Performed by: FAMILY MEDICINE

## 2019-07-01 PROCEDURE — G8419 CALC BMI OUT NRM PARAM NOF/U: HCPCS | Performed by: FAMILY MEDICINE

## 2019-07-01 RX ORDER — METHYLPREDNISOLONE 4 MG/1
TABLET ORAL
Qty: 21 KIT | Refills: 0 | Status: SHIPPED | OUTPATIENT
Start: 2019-07-01

## 2019-07-01 NOTE — PROGRESS NOTES
Chief Complaint    Neck Pain (TR MRI C-SPINE)    Follow-up ongoing left shoulder and cervical pain with left arm achiness with numbness. Review of cervical imaging. History of Present Illness:  Qamar Hand is a 52 y.o. 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 Naren Sauceda who is being seen today upon referral from Irene Lopez for evaluation of ongoing left shoulder and cervical pain with left arm achiness with numbness and tingling. She states that she has been having the initial onset of pain in early February 2019 Initially to her left sided parascapular region which has since involved the left side of her cervical spine left shoulder and arm. 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 She is no longer working at the . She describes a throbbing aching pain in her shoulder at rest at 3-4 out of 10 with more sharp 7-8/10 pain with active shoulder elevation. She is also having numbness and tingling in the C6 distribution of her left arm which has been present since about mid March 2019. She was evaluated by Irene Lopez CNP and sent for an MRI of her left shoulder which was obtained at [facility] on 4/10/2019 and showed mild glenohumeral osteoarthritis with mild insertional supraspinatus tendinosis. This did not respond to oral anti-inflammatories with Naprosyn or several sessions of therapy although she has only had 3 sessions of PT and is unable to perform her home-based exercises. She is also having difficulty sleeping with left arm numbness and tingling in this have soreness about the cervical spine as well. She is not it had cervical imaging and most for therapy did not involve her cervical spine. She is being seen today for orthopedic and sports consultation with review of her imaging.     Qamar Hand was last

## 2019-08-07 ENCOUNTER — TELEPHONE (OUTPATIENT)
Dept: ORTHOPEDIC SURGERY | Age: 54
End: 2019-08-07

## 2019-08-13 ENCOUNTER — TELEPHONE (OUTPATIENT)
Dept: ORTHOPEDIC SURGERY | Age: 54
End: 2019-08-13

## 2019-08-14 ENCOUNTER — OFFICE VISIT (OUTPATIENT)
Dept: ORTHOPEDIC SURGERY | Age: 54
End: 2019-08-14
Payer: COMMERCIAL

## 2019-08-14 VITALS
DIASTOLIC BLOOD PRESSURE: 87 MMHG | HEIGHT: 66 IN | WEIGHT: 184.97 LBS | HEART RATE: 77 BPM | BODY MASS INDEX: 29.73 KG/M2 | SYSTOLIC BLOOD PRESSURE: 125 MMHG

## 2019-08-14 DIAGNOSIS — S16.1XXA STRAIN OF NECK MUSCLE, INITIAL ENCOUNTER: ICD-10-CM

## 2019-08-14 DIAGNOSIS — R20.0 LEFT ARM NUMBNESS: Primary | ICD-10-CM

## 2019-08-14 DIAGNOSIS — M25.512 LEFT SHOULDER PAIN, UNSPECIFIED CHRONICITY: ICD-10-CM

## 2019-08-14 PROCEDURE — 3017F COLORECTAL CA SCREEN DOC REV: CPT | Performed by: PHYSICIAN ASSISTANT

## 2019-08-14 PROCEDURE — 99203 OFFICE O/P NEW LOW 30 MIN: CPT | Performed by: PHYSICIAN ASSISTANT

## 2019-08-14 PROCEDURE — G8419 CALC BMI OUT NRM PARAM NOF/U: HCPCS | Performed by: PHYSICIAN ASSISTANT

## 2019-08-14 PROCEDURE — 1036F TOBACCO NON-USER: CPT | Performed by: PHYSICIAN ASSISTANT

## 2019-08-14 PROCEDURE — G8427 DOCREV CUR MEDS BY ELIG CLIN: HCPCS | Performed by: PHYSICIAN ASSISTANT

## 2019-08-14 NOTE — PROGRESS NOTES
New Patient: SPINE    CHIEF COMPLAINT:    Chief Complaint   Patient presents with    Neck Pain     Pain goes down left arm. HISTORY OF PRESENT ILLNESS:                The patient is a 52 y.o. female referred by Dr. Terrance Tang for neck and left arm pain. She reports an 8-month history of atraumatic intermittent midline neck/left trap pain and left shoulder pain. Her neck pain is typically increased at nighttime or with prolonged activity. Her shoulder pain is increased with any above head activity, shoulder ROM. She notes difficulty due to pain with lifting her left shoulder and strapping her bra. Periodically she will experience numbness extending into the left forearm and hand this is also been ongoing for 8 months. Conservative care includes left shoulder injection with 2 days relief, NSAIDs, PT for her shoulder, prednisone. She denies any improvement at this time. She denies any progressive numbness or weakness. No fine motor difficulty or gait instability. No lower extremity symptoms. No recent trauma. No dyspnea or chest pain. Pain Assessment  Location of Pain: Neck  Severity of Pain: 5  Quality of Pain: Sharp, Dull, Aching  Duration of Pain: Persistent  Frequency of Pain: Constant  Aggravating Factors: Stairs, Walking, Standing, Squatting, Kneeling, Exercise, Straightening, Stretching, Bending  Limiting Behavior: Yes  Relieving Factors: Rest  Result of Injury: No  Work-Related Injury: No  Are there other pain locations you wish to document?: No    The pain assessment was noted & reviewed in the medical record today.      Current/Past Treatment:   · Physical Therapy: Yes, shoulder  · Chiropractic:     · Injection:   L shoulder--2 days relief   Medications:            NSAIDS: Lodine             Muscle relaxer:  Flexeril            Steriods:  MDP, Prednisone             Neuropathic medications:  Lyrica            Opioids:            Other:   · Surgery/Consult: No    Work Status/Functionality:

## 2019-08-21 ENCOUNTER — TELEPHONE (OUTPATIENT)
Dept: ORTHOPEDIC SURGERY | Age: 54
End: 2019-08-21

## 2019-08-21 ENCOUNTER — OFFICE VISIT (OUTPATIENT)
Dept: ORTHOPEDIC SURGERY | Age: 54
End: 2019-08-21
Payer: COMMERCIAL

## 2019-08-21 VITALS — BODY MASS INDEX: 29.73 KG/M2 | WEIGHT: 184.97 LBS | HEIGHT: 66 IN

## 2019-08-21 DIAGNOSIS — M75.02 ADHESIVE CAPSULITIS OF LEFT SHOULDER: Primary | ICD-10-CM

## 2019-08-21 PROCEDURE — 3017F COLORECTAL CA SCREEN DOC REV: CPT | Performed by: ORTHOPAEDIC SURGERY

## 2019-08-21 PROCEDURE — 99214 OFFICE O/P EST MOD 30 MIN: CPT | Performed by: ORTHOPAEDIC SURGERY

## 2019-08-21 PROCEDURE — G8419 CALC BMI OUT NRM PARAM NOF/U: HCPCS | Performed by: ORTHOPAEDIC SURGERY

## 2019-08-21 PROCEDURE — L3660 SO 8 AB RSTR CAN/WEB PRE OTS: HCPCS | Performed by: ORTHOPAEDIC SURGERY

## 2019-08-21 PROCEDURE — 1036F TOBACCO NON-USER: CPT | Performed by: ORTHOPAEDIC SURGERY

## 2019-08-21 PROCEDURE — G8427 DOCREV CUR MEDS BY ELIG CLIN: HCPCS | Performed by: ORTHOPAEDIC SURGERY

## 2019-08-21 NOTE — PROGRESS NOTES
spent 25 minutes face-to-face with the patient and greater than 50% that time was spent counseling/coordinating care for the above stated diagnosis and treatment. I have personally performed and/or participated in the history, exam and medical decision making and agree with all pertinent clinical information. I have also reviewed and agree with the past medical, family and social history unless otherwise noted. This dictation was performed with a verbal recognition program (DRAGON) and it was checked for errors. It is possible that there are still dictated errors within this office note. If so, please bring any errors to my attention for an addendum. All efforts were made to ensure that this office note is accurate.           Prasanna Titus MD

## 2019-08-26 ENCOUNTER — TELEPHONE (OUTPATIENT)
Dept: ORTHOPEDIC SURGERY | Age: 54
End: 2019-08-26

## 2019-09-03 ENCOUNTER — TELEPHONE (OUTPATIENT)
Dept: ORTHOPEDIC SURGERY | Age: 54
End: 2019-09-03

## 2020-12-19 ENCOUNTER — APPOINTMENT (OUTPATIENT)
Dept: GENERAL RADIOLOGY | Age: 55
End: 2020-12-19
Payer: COMMERCIAL

## 2020-12-19 ENCOUNTER — HOSPITAL ENCOUNTER (EMERGENCY)
Age: 55
Discharge: HOME | End: 2020-12-19
Payer: COMMERCIAL

## 2020-12-19 VITALS
RESPIRATION RATE: 16 BRPM | HEART RATE: 78 BPM | WEIGHT: 195 LBS | DIASTOLIC BLOOD PRESSURE: 71 MMHG | BODY MASS INDEX: 31.34 KG/M2 | HEIGHT: 66 IN | SYSTOLIC BLOOD PRESSURE: 128 MMHG | TEMPERATURE: 98.8 F | OXYGEN SATURATION: 98 %

## 2020-12-19 LAB
A/G RATIO: 1.3 (ref 1.1–2.2)
ALBUMIN SERPL-MCNC: 4 G/DL (ref 3.4–5)
ALP BLD-CCNC: 53 U/L (ref 40–129)
ALT SERPL-CCNC: 13 U/L (ref 10–40)
ANION GAP SERPL CALCULATED.3IONS-SCNC: 10 MMOL/L (ref 3–16)
AST SERPL-CCNC: 17 U/L (ref 15–37)
BASOPHILS ABSOLUTE: 0 K/UL (ref 0–0.2)
BASOPHILS RELATIVE PERCENT: 0.7 %
BILIRUB SERPL-MCNC: <0.2 MG/DL (ref 0–1)
BUN BLDV-MCNC: 9 MG/DL (ref 7–20)
CALCIUM SERPL-MCNC: 8.7 MG/DL (ref 8.3–10.6)
CHLORIDE BLD-SCNC: 105 MMOL/L (ref 99–110)
CO2: 23 MMOL/L (ref 21–32)
CREAT SERPL-MCNC: 0.6 MG/DL (ref 0.6–1.1)
EOSINOPHILS ABSOLUTE: 0 K/UL (ref 0–0.6)
EOSINOPHILS RELATIVE PERCENT: 0.5 %
GFR AFRICAN AMERICAN: >60
GFR NON-AFRICAN AMERICAN: >60
GLOBULIN: 3.1 G/DL
GLUCOSE BLD-MCNC: 96 MG/DL (ref 70–99)
HCT VFR BLD CALC: 43.2 % (ref 36–48)
HEMOGLOBIN: 14.3 G/DL (ref 12–16)
LYMPHOCYTES ABSOLUTE: 1.5 K/UL (ref 1–5.1)
LYMPHOCYTES RELATIVE PERCENT: 27.5 %
MCH RBC QN AUTO: 27.8 PG (ref 26–34)
MCHC RBC AUTO-ENTMCNC: 33.2 G/DL (ref 31–36)
MCV RBC AUTO: 83.7 FL (ref 80–100)
MONOCYTES ABSOLUTE: 0.4 K/UL (ref 0–1.3)
MONOCYTES RELATIVE PERCENT: 6.7 %
NEUTROPHILS ABSOLUTE: 3.5 K/UL (ref 1.7–7.7)
NEUTROPHILS RELATIVE PERCENT: 64.6 %
PDW BLD-RTO: 14.7 % (ref 12.4–15.4)
PLATELET # BLD: 314 K/UL (ref 135–450)
PMV BLD AUTO: 7.4 FL (ref 5–10.5)
POTASSIUM SERPL-SCNC: 3.8 MMOL/L (ref 3.5–5.1)
RBC # BLD: 5.16 M/UL (ref 4–5.2)
SODIUM BLD-SCNC: 138 MMOL/L (ref 136–145)
TOTAL PROTEIN: 7.1 G/DL (ref 6.4–8.2)
WBC # BLD: 5.4 K/UL (ref 4–11)

## 2020-12-19 PROCEDURE — 99283 EMERGENCY DEPT VISIT LOW MDM: CPT

## 2020-12-19 PROCEDURE — 85025 COMPLETE CBC W/AUTO DIFF WBC: CPT

## 2020-12-19 PROCEDURE — 80053 COMPREHEN METABOLIC PANEL: CPT

## 2020-12-19 PROCEDURE — 71045 X-RAY EXAM CHEST 1 VIEW: CPT

## 2020-12-19 NOTE — ED PROVIDER NOTES
[address] [place] [address]  ED  EMERGENCY DEPARTMENT ENCOUNTER      This patient was not seen and evaluated by the attending physician. Pt Name: Arelis Bonilla  MRN: [id number]  [place] 1965  Date of evaluation: 12/19/2020  Provider: Gleda Castleman, APRN - CNP-C  PCP: Shawna Talavera      History provided by the patient    CHIEFCOMPLAINT:     Chief Complaint   Patient presents with    Shortness of Breath     last night + COVID Tuesday       HISTORY OF PRESENT ILLNESS:      Arelis Bonilla is a 60 y.o. female who presents to [address] [place] [address]  ED with complaints of shortness of breath. Patient states that she has short of breath last night, she was diagnosed with Covid on Tuesday. Denies any chest pain. Denies fevers. Denies any abdominal pain, nausea or vomiting. She is resting comfortably in the bed. She is here for further evaluation. LOCATION:-  QUALITY:-  SEVERITY:-  DURATION:-  MODIFYING FACTORS:-    Nursing Notes were reviewed     REVIEW OF SYSTEMS:     Review of Systems  All systems, a total of 10, are reviewed and negative except for those that were just noted in history present illness. PAST MEDICAL HISTORY:     Past Medical History:   Diagnosis Date    Acute UTI     Cervical spine pain 4/15/2019    Left cervical radiculopathy 7/1/2019         SURGICAL HISTORY:      Past Surgical History:   Procedure Laterality Date    ECTOPIC PREGNANCY SURGERY      TONSILLECTOMY           CURRENT MEDICATIONS:       Discharge Medication List as of 12/19/2020  2:29 PM      CONTINUE these medications which have NOT CHANGED    Details   methylPREDNISolone (MEDROL DOSEPACK) 4 MG tablet Take by mouth as directed., Disp-21 kit, R-0Normal      predniSONE (DELTASONE) 20 MG tablet Prednisone 20 mg taper:   Take 3 po qd for 3 days, then 2 po qd for 3 days, then 1 po qd for 3 days, then 1/2 po qd for 4 days, Disp-20 tablet, R-0Normal      etodolac (LODINE) 400 MG tablet Take 1 tablet by mouth 2 times daily, Disp-60 tablet, R-3Normal      oxybutynin (DITROPAN) 5 MG tablet Historical Med      cyclobenzaprine (FLEXERIL) 5 MG tablet Historical Med      diclofenac (VOLTAREN) 75 MG EC tablet Take 1 tablet by mouth 2 times daily (with meals), Disp-60 tablet, R-3Normal      fluticasone (FLONASE) 50 MCG/ACT nasal spray 1 spray by Nasal route dailyHistorical Med      albuterol sulfate  (90 Base) MCG/ACT inhaler Inhale 2 puffs into the lungs every 6 hours as needed for WheezingHistorical Med      loratadine (CLARITIN) 10 MG tablet Take 1 tablet by mouth daily, Disp-30 tablet, R-0Print      tamsulosin (FLOMAX) 0.4 MG capsule Take 0.4 mg by mouth daily.   Historical Med               ALLERGIES:    Amoxicillin and Penicillins    FAMILY HISTORY:       Family History   Problem Relation Age of Onset    Cancer Father     Cancer Mother     Cancer Sister           SOCIAL HISTORY:     Social History     Socioeconomic History    Marital status: Single     Spouse name: None    Number of children: None    Years of education: None    Highest education level: None   Occupational History    None   Social Needs    Financial resource strain: None    Food insecurity     Worry: None     Inability: None    Transportation needs     Medical: None     Non-medical: None   Tobacco Use    Smoking status: Never Smoker    Smokeless tobacco: Never Used   Substance and Sexual Activity    Alcohol use: No    Drug use: No    Sexual activity: None   Lifestyle    Physical activity     Days per week: None     Minutes per session: None    Stress: None   Relationships    Social connections     Talks on phone: None     Gets together: None     Attends Restoration service: None     Active member of club or organization: None     Attends meetings of clubs or organizations: None     Relationship status: None    Intimate partner violence     Fear of current or ex partner: None     Emotionally abused: None     Physically abused: None     Forced sexual activity: None   Other Topics Concern    None   Social History Narrative    None       SCREENINGS:             PHYSICAL EXAM:       ED Triage Vitals   BP Temp Temp Source Pulse Resp SpO2 Height Weight   12/19/20 1305 12/19/20 1305 12/19/20 1305 12/19/20 1238 12/19/20 1305 12/19/20 1238 12/19/20 1305 12/19/20 1305   (!) 149/79 98.5 °F (36.9 °C) Oral 85 18 98 % 5' 6\" (1.676 m) 195 lb (88.5 kg)       Physical Exam    CONSTITUTIONAL: Awake and alert. Cooperative. Well-developed. Well-nourished. Vitals:    12/19/20 1238 12/19/20 1305 12/19/20 1334 12/19/20 1446   BP:  (!) 149/79 136/77 128/71   Pulse: 85 75 81 78   Resp:  18 16 16   Temp:  98.5 °F (36.9 °C)  98.8 °F (37.1 °C)   TempSrc:  Oral  Oral   SpO2: 98% 97% 98% 98%   Weight:  195 lb (88.5 kg)     Height:  5' 6\" (1.676 m)       HENT: Normocephalic. Atraumatic. External ears normal, without discharge. TMs clear bilaterally. Nonasal discharge. Oropharynx clear, no erythema. Mucous membranes moist.  EYES: Conjunctiva non-injected, nolid abnormalities noted. No scleral icterus. PERRL. EOM's grossly intact. Anterior chambers clear. NECK: Supple. Normal ROM. No meningismus. No thyroid tenderness or swelling noted. CARDIOVASCULAR: RRR. No Murmer. No carotid bruits. PULMONARY/CHEST WALL: Effort normal. No tachypnea. Lungs clear to ausculation. ABDOMEN: Normal BS. Soft. Nondistended. No tenderness to palpation. No guarding. No hernias noted. No splenomegaly. Back: Spine is midline. No ecchymosis. No crepituson palpation. No obvious subluxation of vertebral column. No saddle anesthesia or evidence of cauda equina. /ANORECTAL: Not assessed  MUSKULOSKELETAL: Normal ROM. No acute deformities. No edema. No tenderness to palpate. SKIN: Warm and dry. NEUROLOGICAL:  GCS 15. CN II-XII grossly intact. Strength is 5/5 in all extremities and sensation is intact. PSYCHIATRIC: Normal affect, normal insight and judgement.  Alert and oriented x 3. DIAGNOSTIC RESULTS:     LABS:    Results for orders placed or performed during the hospital encounter of 12/19/20   Comprehensive Metabolic Panel   Result Value Ref Range    Sodium 138 136 - 145 mmol/L    Potassium 3.8 3.5 - 5.1 mmol/L    Chloride 105 99 - 110 mmol/L    CO2 23 21 - 32 mmol/L    Anion Gap 10 3 - 16    Glucose 96 70 - 99 mg/dL    BUN 9 7 - 20 mg/dL    CREATININE 0.6 0.6 - 1.1 mg/dL    GFR Non-African American >60 >60    GFR African American >60 >60    Calcium 8.7 8.3 - 10.6 mg/dL    Total Protein 7.1 6.4 - 8.2 g/dL    Alb 4.0 3.4 - 5.0 g/dL    Albumin/Globulin Ratio 1.3 1.1 - 2.2    Total Bilirubin <0.2 0.0 - 1.0 mg/dL    Alkaline Phosphatase 53 40 - 129 U/L    ALT 13 10 - 40 U/L    AST 17 15 - 37 U/L    Globulin 3.1 g/dL   CBC Auto Differential   Result Value Ref Range    WBC 5.4 4.0 - 11.0 K/uL    RBC 5.16 4.00 - 5.20 M/uL    Hemoglobin 14.3 12.0 - 16.0 g/dL    Hematocrit 43.2 36.0 - 48.0 %    MCV 83.7 80.0 - 100.0 fL    MCH 27.8 26.0 - 34.0 pg    MCHC 33.2 31.0 - 36.0 g/dL    RDW 14.7 12.4 - 15.4 %    Platelets 875 333 - 764 K/uL    MPV 7.4 5.0 - 10.5 fL    Neutrophils % 64.6 %    Lymphocytes % 27.5 %    Monocytes % 6.7 %    Eosinophils % 0.5 %    Basophils % 0.7 %    Neutrophils Absolute 3.5 1.7 - 7.7 K/uL    Lymphocytes Absolute 1.5 1.0 - 5.1 K/uL    Monocytes Absolute 0.4 0.0 - 1.3 K/uL    Eosinophils Absolute 0.0 0.0 - 0.6 K/uL    Basophils Absolute 0.0 0.0 - 0.2 K/uL         RADIOLOGY:  All x-ray studies are viewed/reviewed by me. Formal interpretations per the radiologist are as follows:      XR CHEST PORTABLE   Final Result   No acute cardiopulmonary disease. EKG:  See EKG interpretation by an attending physician.       PROCEDURES:   N/A    CRITICAL CARE TIME:   N/A    CONSULTS:  None      EMERGENCY DEPARTMENT COURSE andDIFFERENTIAL DIAGNOSIS/MDM:   Vitals:    Vitals:    12/19/20 1238 12/19/20 1305 12/19/20 1334 12/19/20 1446   BP:  (!) 149/79 136/77 128/71   Pulse: 85 75 81 78   Resp:  18 16 16   Temp:  98.5 °F (36.9 °C)  98.8 °F (37.1 °C)   TempSrc:  Oral  Oral   SpO2: 98% 97% 98% 98%   Weight:  195 lb (88.5 kg)     Height:  5' 6\" (1.676 m)         Patient wasgiven the following medications:  Medications - No data to display      Patient was evaluated independently by myself with the attending physician available for consultation. Patient presented to the emergency room today after testing positive for Covid, she had shortness of breath started yesterday. Patient work-up today was unremarkable, labs were normal, no leukocytosis, patient vital signs stable, no hypoxia, she was ambulated without hypoxia. Patient instructed to follow-up with primary care physician, return to the ED for any worsening symptoms. No interventions necessary. She was discharged in good condition. Patient laboratory studies, radiographic imaging, and assessment were all discussed with the patient and/orpatient family. There was shared decision-making between myself as well as the patient and/or their surrogate and we are all in agreement with discharge home. There was an opportunity for questions and all questions were answered tothe best of my ability and to the satisfaction of the patient and/or patient family. FINAL IMPRESSION:      1.  Shortness of breath    2. COVID-19          DISPOSITION/PLAN:   DISPOSITION Decision To Discharge      PATIENT REFERRED TO:  [facility]  ED  [address] [facility] [address] [phone]  Go to   If symptoms worsen    Tyler Waite  [facility] [address] [place] [address]  [phone]    Call   For follow up      DISCHARGE MEDICATIONS:  Discharge Medication List as of 12/19/2020  2:29 PM                     (Please note thatportions of this note were completed with a voice recognition program.  Efforts were made to edit the dictations, but occasionally words are mis-transcribed.)    JACQUELINE Ruiz - CNP-C (electronicallysigned)        JACQUELINE Burgess - CNP  12/19/20 [id number]

## 2020-12-19 NOTE — ED NOTES
Pt ambulated 200 feet on RA and without assistance. Starting SpO2 95%, HR 81 bpm. Pt ambulated with steady gait through out, remained asymptomatic. Ending SpO2 99%, Hr 100 bpm. Pt resting comfortably with no request, JENNA Oscar aware of trial results.             Ethel Rodriguez  12/19/20 2767

## 2020-12-21 ENCOUNTER — CARE COORDINATION (OUTPATIENT)
Dept: CARE COORDINATION | Age: 55
End: 2020-12-21

## 2020-12-22 NOTE — CARE COORDINATION
Patient contacted regarding [id number] diagnosis\". Discussed COVID-19 related testing which was available at this time. Test results were positive. Patient informed of results, if available? Yes    Care Transition Nurse/ Ambulatory Care Manager contacted the patient by telephone to perform post discharge assessment. Call within 2 business days of discharge: Yes. Verified name and  with patient as identifiers. Provided introduction to self, and explanation of the CTN/ACM role, and reason for call due to risk factors for infection and/or exposure to COVID-19. Symptoms reviewed with patient who verbalized the following symptoms: shortness of breath. Due to no new or worsening symptoms encounter was not routed to provider for escalation. Discussed follow-up appointments. If no appointment was previously scheduled, appointment scheduling offered: No and patient stated she alredy talked to [facility] follow up appointment(s): No future appointments. Non-[facility] follow up appointment(s): none    Non-face-to-face services provided:  Education of patient/family/caregiver/guardian to support self-management-for COVID 19     Advance Care Planning:   Does patient have an Advance Directive:  patient declined education. Patient has following risk factors of: no known risk factors. CTN/ACM reviewed discharge instructions, medical action plan and red flags such as increased shortness of breath, increasing fever and signs of decompensation with patient who verbalized understanding. Discussed exposure protocols and quarantine with CDC Guidelines What to do if you are sick with coronavirus disease 2019.  Patient was given an opportunity for questions and concerns. The patient agrees to contact the [facility] exposure line [phone], local [facility] department [place] [facility]: ([phone]) and PCP office for questions related to their healthcare.  CTN/ACM provided contact information for future

## 2021-03-15 LAB — PAP SMEAR, EXTERNAL: NEGATIVE

## 2021-03-17 ENCOUNTER — HOSPITAL ENCOUNTER (EMERGENCY)
Age: 56
Discharge: HOME | End: 2021-03-17
Attending: EMERGENCY MEDICINE
Payer: COMMERCIAL

## 2021-03-17 VITALS
HEIGHT: 66 IN | OXYGEN SATURATION: 96 % | BODY MASS INDEX: 29.73 KG/M2 | HEART RATE: 77 BPM | WEIGHT: 185 LBS | DIASTOLIC BLOOD PRESSURE: 73 MMHG | SYSTOLIC BLOOD PRESSURE: 149 MMHG | RESPIRATION RATE: 16 BRPM | TEMPERATURE: 98.3 F

## 2021-03-17 DIAGNOSIS — R82.71 BACTERIURIA: ICD-10-CM

## 2021-03-17 DIAGNOSIS — R33.8 ACUTE URINARY RETENTION: Primary | ICD-10-CM

## 2021-03-17 LAB
BACTERIA: ABNORMAL /HPF
BILIRUBIN URINE: NEGATIVE
BLOOD, URINE: NEGATIVE
CLARITY: CLEAR
COLOR: YELLOW
EPITHELIAL CELLS, UA: ABNORMAL /HPF (ref 0–5)
GLUCOSE URINE: 250 MG/DL
KETONES, URINE: NEGATIVE MG/DL
LEUKOCYTE ESTERASE, URINE: NEGATIVE
MICROSCOPIC EXAMINATION: YES
NITRITE, URINE: POSITIVE
PH UA: 6.5 (ref 5–8)
PROTEIN UA: NEGATIVE MG/DL
RBC UA: ABNORMAL /HPF (ref 0–4)
SPECIFIC GRAVITY UA: 1.01 (ref 1–1.03)
URINE TYPE: ABNORMAL
UROBILINOGEN, URINE: 1 E.U./DL
WBC UA: ABNORMAL /HPF (ref 0–5)

## 2021-03-17 PROCEDURE — 99283 EMERGENCY DEPT VISIT LOW MDM: CPT

## 2021-03-17 PROCEDURE — 81001 URINALYSIS AUTO W/SCOPE: CPT

## 2021-03-17 PROCEDURE — 51798 US URINE CAPACITY MEASURE: CPT

## 2021-03-17 RX ORDER — CIPROFLOXACIN 500 MG/1
500 TABLET, FILM COATED ORAL 2 TIMES DAILY
Qty: 6 TABLET | Refills: 0 | Status: SHIPPED | OUTPATIENT
Start: 2021-03-17 | End: 2021-03-20

## 2021-03-17 ASSESSMENT — ENCOUNTER SYMPTOMS
APNEA: 0
ABDOMINAL DISTENTION: 0
BLOOD IN STOOL: 0
CHEST TIGHTNESS: 0
SORE THROAT: 0
WHEEZING: 0
SINUS PRESSURE: 0
COUGH: 0
STRIDOR: 0
VOICE CHANGE: 0
TROUBLE SWALLOWING: 0
RECTAL PAIN: 0
ABDOMINAL PAIN: 0
NAUSEA: 0
COLOR CHANGE: 0
BACK PAIN: 0
RHINORRHEA: 0
DIARRHEA: 0
SHORTNESS OF BREATH: 0
EYE PAIN: 0
EYE DISCHARGE: 0
VOMITING: 0
PHOTOPHOBIA: 0
EYE REDNESS: 0
CONSTIPATION: 0

## 2021-03-17 ASSESSMENT — PAIN DESCRIPTION - LOCATION: LOCATION: ABDOMEN

## 2021-03-17 NOTE — ED PROVIDER NOTES
Jonathan Abreu is a 60year old female who states that she has been having trouble urinating for about 1 week. She has a 'constant' sensation that she has to urinate, and it burns when she does urinate. She has trouble starting her urinary stream, but is unable to stop it. She's unsure if she's emptying her bladder, and she has suprapubic discomfort. She saw her gynecologist on Monday and she had a UA, which did not show infection. She was started on Pyridium, which she is taking but without improvement. She had similar problems about 10 years ago, and had to have a johnson catheter. She was given a referral to urologist, but she has not seen them yet. Blood pressure (!) 133/8, pulse 80, temperature 98.3 °F (36.8 °C), temperature source Oral, resp. rate 20, height 5' 6\" (1.676 m), weight 185 lb (83.9 kg), last menstrual period 03/27/2013, SpO2 96 %, not currently breastfeeding. I have reviewed the following from the nursing documentation:      Prior to Admission medications    Medication Sig Start Date End Date Taking? Authorizing Provider   methylPREDNISolone (MEDROL DOSEPACK) 4 MG tablet Take by mouth as directed. Patient not taking: Reported on 8/14/2019 7/1/19   Liam Aguilera MD   predniSONE (DELTASONE) 20 MG tablet Prednisone 20 mg taper:   Take 3 po qd for 3 days, then 2 po qd for 3 days, then 1 po qd for 3 days, then 1/2 po qd for 4 days 5/16/19   Liam Aguilera MD   etodolac (LODINE) 400 MG tablet Take 1 tablet by mouth 2 times daily 5/16/19 5/15/20  Liam Aguilera MD   oxybutynin (DITROPAN) 5 MG tablet  3/4/19   Historical Provider, MD   cyclobenzaprine (FLEXERIL) 5 MG tablet  2/26/19   Historical Provider, MD   diclofenac (VOLTAREN) 75 MG EC tablet Take 1 tablet by mouth 2 times daily (with meals) 4/15/19   Liam Aguilera MD   fluticasone (FLONASE) 50 MCG/ACT nasal spray 1 spray by Nasal route daily    Historical Provider, MD   albuterol sulfate  (90 Base) MCG/ACT inhaler Inhale 2 puffs into the lungs every 6 hours as needed for Wheezing    Historical Provider, MD   loratadine (CLARITIN) 10 MG tablet Take 1 tablet by mouth daily 2/27/18   JACQUELINE Ritchie - CNP   tamsulosin (FLOMAX) 0.4 MG capsule Take 0.4 mg by mouth daily.       Historical Provider, MD       Allergies as of 03/17/2021 - Review Complete 03/17/2021   Allergen Reaction Noted    Amoxicillin Hives 04/06/2010    Penicillins Hives 04/06/2010       Past Medical History:   Diagnosis Date    Acute UTI     Cervical spine pain 4/15/2019    Left cervical radiculopathy 7/1/2019        Surgical History:   Past Surgical History:   Procedure Laterality Date    ECTOPIC PREGNANCY SURGERY      TONSILLECTOMY          Family History:    Family History   Problem Relation Age of Onset    Cancer Father     Cancer Mother     Cancer Sister        Social History     Socioeconomic History    Marital status: Single     Spouse name: Not on file    Number of children: Not on file    Years of education: Not on file    Highest education level: Not on file   Occupational History    Not on file   Social Needs    Financial resource strain: Not on file    Food insecurity     Worry: Not on file     Inability: Not on file    Transportation needs     Medical: Not on file     Non-medical: Not on file   Tobacco Use    Smoking status: Never Smoker    Smokeless tobacco: Never Used   Substance and Sexual Activity    Alcohol use: No    Drug use: No    Sexual activity: Not on file   Lifestyle    Physical activity     Days per week: Not on file     Minutes per session: Not on file    Stress: Not on file   Relationships    Social connections     Talks on phone: Not on file     Gets together: Not on file     Attends Muslim service: Not on file     Active member of club or organization: Not on file     Attends meetings of clubs or organizations: Not on file     Relationship status: Not on file    Intimate partner violence     Fear of current or ex partner: Not on file     Emotionally abused: Not on file     Physically abused: Not on file     Forced sexual activity: Not on file   Other Topics Concern    Not on file   Social History Narrative    Not on file         Review of Systems   Constitutional: Negative for activity change, appetite change, chills, diaphoresis, fatigue, fever and unexpected weight change. HENT: Negative for congestion, ear pain, mouth sores, rhinorrhea, sinus pressure, sore throat, tinnitus, trouble swallowing and voice change. Eyes: Negative for photophobia, pain, discharge, redness and visual disturbance. Respiratory: Negative for apnea, cough, chest tightness, shortness of breath, wheezing and stridor. Cardiovascular: Negative for chest pain, palpitations and leg swelling. Gastrointestinal: Negative for abdominal distention, abdominal pain, blood in stool, constipation, diarrhea, nausea, rectal pain and vomiting. Genitourinary: Positive for difficulty urinating, dysuria and frequency. Negative for dyspareunia, flank pain, genital sores, menstrual problem, pelvic pain, urgency, vaginal bleeding, vaginal discharge and vaginal pain. Musculoskeletal: Negative for arthralgias, back pain, joint swelling, neck pain and neck stiffness. Skin: Negative for color change and rash. Neurological: Negative for dizziness, tremors, seizures, syncope, facial asymmetry, speech difficulty, weakness, light-headedness, numbness and headaches. Hematological: Negative for adenopathy. Does not bruise/bleed easily. Psychiatric/Behavioral: Negative for agitation, confusion, dysphoric mood, hallucinations, self-injury, sleep disturbance and suicidal ideas. All other systems reviewed and are negative. Physical Exam  Constitutional:       General: She is not in acute distress. Appearance: She is well-developed. HENT:      Head: Normocephalic and atraumatic.       Left Ear: External ear normal.      Mouth/Throat:      Pharynx: No oropharyngeal exudate. Eyes:      General:         Right eye: No discharge. Left eye: No discharge. Conjunctiva/sclera: Conjunctivae normal.      Pupils: Pupils are equal, round, and reactive to light. Neck:      Musculoskeletal: Normal range of motion. Vascular: No JVD. Trachea: No tracheal deviation. Cardiovascular:      Rate and Rhythm: Normal rate and regular rhythm. Heart sounds: Normal heart sounds. No murmur. No friction rub. No gallop. Pulmonary:      Effort: Pulmonary effort is normal. No respiratory distress. Breath sounds: Normal breath sounds. No stridor. No wheezing or rales. Chest:      Chest wall: No tenderness. Abdominal:      General: Bowel sounds are normal. There is no distension. Palpations: Abdomen is soft. There is no mass. Tenderness: There is abdominal tenderness (suprapubic). There is no guarding or rebound. Musculoskeletal: Normal range of motion. General: No tenderness. Lymphadenopathy:      Cervical: No cervical adenopathy. Skin:     Findings: No rash. Neurological:      Mental Status: She is alert and oriented to person, place, and time. Cranial Nerves: No cranial nerve deficit. Motor: No abnormal muscle tone. Coordination: Coordination normal.      Deep Tendon Reflexes: Reflexes normal.   Psychiatric:         Behavior: Behavior normal.         Thought Content:  Thought content normal.         Judgment: Judgment normal.          Procedures     MDM   Results for orders placed or performed during the hospital encounter of 03/17/21   Urinalysis, reflex to microscopic   Result Value Ref Range    Color, UA Yellow Straw/Yellow    Clarity, UA Clear Clear    Glucose, Ur 250 (A) Negative mg/dL    Bilirubin Urine Negative Negative    Ketones, Urine Negative Negative mg/dL    Specific Gravity, UA 1.010 1.005 - 1.030    Blood, Urine Negative Negative    pH, UA 6.5 5.0 - 8.0    Protein, UA Negative Negative mg/dL Urobilinogen, Urine 1.0 <2.0 E.U./dL    Nitrite, Urine POSITIVE (A) Negative    Leukocyte Esterase, Urine Negative Negative    Microscopic Examination YES     Urine Type NotGiven    Microscopic Urinalysis   Result Value Ref Range    WBC, UA None seen 0 - 5 /HPF    RBC, UA 0-2 0 - 4 /HPF    Epithelial Cells, UA 0-1 0 - 5 /HPF    Bacteria, UA Rare (A) None Seen /HPF       I estimate there is LOW risk for ACUTE APPENDICITIS, BOWEL OBSTRUCTION, CHOLECYSTITIS, DIVERTICULITIS, INCARCERATED HERNIA, PANCREATITIS, PELVIC INFLAMMATORY DISEASE, PERFORATED BOWEL or ULCER, PREGNANCY, or TUBO-OVARIAN ABSCESS, thus I consider the discharge disposition reasonable. Also, there is no evidence or peritonitis, sepsis, or toxicity. Justo Clement and I have discussed the diagnosis and risks, and we agree with discharging home to follow-up with their primary doctor. We also discussed returning to the Emergency Department immediately if new or worsening symptoms occur. We have discussed the symptoms which are most concerning (e.g., bloody stool, fever, changing or worsening pain, vomiting) that necessitate immediate return. Final Impression    1. Acute urinary retention    2. Bacteriuria        Blood pressure (!) 149/73, pulse 77, temperature 98.3 °F (36.8 °C), temperature source Oral, resp. rate 16, height 5' 6\" (1.676 m), weight 185 lb (83.9 kg), last menstrual period 03/27/2013, SpO2 96 %, not currently breastfeeding.          Danya Boyd MD  03/17/21 [id number]

## 2021-03-17 NOTE — ED NOTES
Attempt to place 16 Ukrainian catheter at this time. Pt meatus swollen. Pt unable to tolerate.        Marty Castillo RN  03/17/21 [id number]

## 2021-03-17 NOTE — ED NOTES
Pt ok to d/c to home. Pt given d/c instructions. Pt verbalized understating including Rx and follow up care. Pt ambulated to [facility] for ride home.  0 s/s of distress at time of d/c.          Carolyn Lake RN  03/17/21 [id number]

## 2021-07-23 ENCOUNTER — HOSPITAL ENCOUNTER (EMERGENCY)
Age: 56
Discharge: HOME | End: 2021-07-23
Attending: EMERGENCY MEDICINE
Payer: COMMERCIAL

## 2021-07-23 VITALS
TEMPERATURE: 98.7 F | OXYGEN SATURATION: 98 % | RESPIRATION RATE: 16 BRPM | DIASTOLIC BLOOD PRESSURE: 75 MMHG | HEART RATE: 86 BPM | SYSTOLIC BLOOD PRESSURE: 145 MMHG

## 2021-07-23 DIAGNOSIS — B35.6 TINEA CRURIS: ICD-10-CM

## 2021-07-23 DIAGNOSIS — R33.9 URINARY RETENTION: Primary | ICD-10-CM

## 2021-07-23 LAB
BILIRUBIN URINE: NEGATIVE
BLOOD, URINE: ABNORMAL
CLARITY: CLEAR
COLOR: YELLOW
EPITHELIAL CELLS, UA: ABNORMAL /HPF (ref 0–5)
GLUCOSE URINE: NEGATIVE MG/DL
KETONES, URINE: NEGATIVE MG/DL
LEUKOCYTE ESTERASE, URINE: NEGATIVE
MICROSCOPIC EXAMINATION: YES
NITRITE, URINE: NEGATIVE
PH UA: 6 (ref 5–8)
PROTEIN UA: NEGATIVE MG/DL
RBC UA: ABNORMAL /HPF (ref 0–4)
SPECIFIC GRAVITY UA: 1.01 (ref 1–1.03)
URINE REFLEX TO CULTURE: ABNORMAL
URINE TYPE: ABNORMAL
UROBILINOGEN, URINE: 0.2 E.U./DL
WBC UA: ABNORMAL /HPF (ref 0–5)

## 2021-07-23 PROCEDURE — 81001 URINALYSIS AUTO W/SCOPE: CPT

## 2021-07-23 PROCEDURE — 51702 INSERT TEMP BLADDER CATH: CPT

## 2021-07-23 PROCEDURE — 99283 EMERGENCY DEPT VISIT LOW MDM: CPT

## 2021-07-23 PROCEDURE — 51798 US URINE CAPACITY MEASURE: CPT

## 2021-07-23 RX ORDER — FLUCONAZOLE 150 MG/1
150 TABLET ORAL ONCE
Qty: 1 TABLET | Refills: 0 | Status: SHIPPED | OUTPATIENT
Start: 2021-07-23 | End: 2021-07-23

## 2021-07-23 ASSESSMENT — PAIN SCALES - GENERAL: PAINLEVEL_OUTOF10: 9

## 2021-07-23 NOTE — ED NOTES
Attempted to put in a 16Fr non latex urethral catheter. Pt stated that she normally needs a smaller one. SABAS Reaves notified.       Nathaniel Dear  07/23/21 [id number]

## 2021-07-23 NOTE — ED NOTES
Pt came in today with urinary retention. Pt state that she had this problem a couple of months ago and seen  at [facility] where she had bladder dilation and pt state that everything was going ok until yesterday. Pt post void bladder scan shows 388 in bladder. Pt cath post void pt with a lg amount of yeast and some swelling. Pt abdomen slight bloated. Pt states that she feels much better after cath placed. Urine sent to lab as ordered.        Jorge Carreno RN  07/23/21 [facility]  07/23/21 [id number]

## 2021-07-23 NOTE — ED PROVIDER NOTES
CHIEF COMPLAINT  Urinary Retention (pt state that she is able to urinate however small amounts at a time. pt state that she had this problem a couple of months ago and went to [place] [id number] where they had to dilate her bladder and everything was good until yestrday)      HISTORY OF PRESENT ILLNESS  Mj Guadalupe is a 60 y.o. female with a history of urinary retention who presents to the ED complaining of urinary retention. Patient states that she has been seen and evaluated by urogynecology at [facility] and had a reported \"stretching \"procedure completed within the last couple of months. Patient reports that she has noted increasing discomfort reoccurring over the last 24 hours. Patient reports mild dysuria as well as urinary urgency stating that she feels that she has to urinate frequently but only is able to urinate a small amount. Patient denies fevers, chills, or sweats. No nausea, vomiting, or diarrhea. .   No other complaints, modifying factors or associated symptoms. I have reviewed the following from the nursing documentation.     Past Medical History:   Diagnosis Date    Acute UTI     Cervical spine pain 4/15/2019    Left cervical radiculopathy 7/1/2019     Past Surgical History:   Procedure Laterality Date    ECTOPIC PREGNANCY SURGERY      TONSILLECTOMY       Family History   Problem Relation Age of Onset    Cancer Father     Cancer Mother     Cancer Sister      Social History     Socioeconomic History    Marital status: Single     Spouse name: Not on file    Number of children: Not on file    Years of education: Not on file    Highest education level: Not on file   Occupational History    Not on file   Tobacco Use    Smoking status: Never Smoker    Smokeless tobacco: Never Used   Vaping Use    Vaping Use: Never used   Substance and Sexual Activity    Alcohol use: No    Drug use: No    Sexual activity: Not on file   Other Topics Concern    Not on file   Social History Narrative    Not on file     Social Determinants of Health     Financial Resource Strain:     Difficulty of Paying Living Expenses:    Food Insecurity:     Worried About Running Out of Food in the Last Year:     [address] in the Last Year:    Transportation Needs:     Lack of Transportation (Medical):  Lack of Transportation (Non-Medical):    Physical Activity:     Days of Exercise per Week:     Minutes of Exercise per Session:    Stress:     Feeling of Stress :    Social Connections:     Frequency of Communication with Friends and Family:     Frequency of Social Gatherings with Friends and Family:     Attends [employer] Services:     Active Member of Clubs or Organizations:     Attends Club or Organization Meetings:     Marital Status:    Intimate Partner Violence:     Fear of Current or Ex-Partner:     Emotionally Abused:     Physically Abused:     Sexually Abused:      No current facility-administered medications for this encounter. Current Outpatient Medications   Medication Sig Dispense Refill    methylPREDNISolone (MEDROL DOSEPACK) 4 MG tablet Take by mouth as directed. (Patient not taking: Reported on 8/14/2019) 21 kit 0    predniSONE (DELTASONE) 20 MG tablet Prednisone 20 mg taper:   Take 3 po qd for 3 days, then 2 po qd for 3 days, then 1 po qd for 3 days, then 1/2 po qd for 4 days 20 tablet 0    etodolac (LODINE) 400 MG tablet Take 1 tablet by mouth 2 times daily 60 tablet 3    oxybutynin (DITROPAN) 5 MG tablet       cyclobenzaprine (FLEXERIL) 5 MG tablet       diclofenac (VOLTAREN) 75 MG EC tablet Take 1 tablet by mouth 2 times daily (with meals) 60 tablet 3    fluticasone (FLONASE) 50 MCG/ACT nasal spray 1 spray by Nasal route daily      albuterol sulfate  (90 Base) MCG/ACT inhaler Inhale 2 puffs into the lungs every 6 hours as needed for Wheezing      loratadine (CLARITIN) 10 MG tablet Take 1 tablet by mouth daily 30 tablet 0    tamsulosin (FLOMAX) 0.4 MG capsule Take 0.4 mg by mouth daily. Allergies   Allergen Reactions    Amoxicillin Hives    Penicillins Hives       REVIEW OF SYSTEMS  10 systems reviewed, pertinent positives per HPI otherwise noted to be negative. PHYSICAL EXAM  BP (!) 145/75   Pulse 86   Temp 98.7 °F (37.1 °C) (Oral)   Resp 16   LMP 03/27/2013   SpO2 98%   GENERAL APPEARANCE: Awake and alert. Cooperative. No acute distress. HEAD: Normocephalic. Atraumatic. EYES: PERRL. EOM's grossly intact. ENT: Mucous membranes are moist.   NECK: Supple, trachea midline. HEART: RRR. Normal S1, S2. No murmurs, rubs or gallops. LUNGS: Respirations unlabored. CTAB. Good air exchange. No wheezes, rales, or rhonchi. Speaking comfortably in full sentences. ABDOMEN: Soft. Non-distended. Lower abdominal tenderness to palpation. No guarding or rebound. No CVA tenderness. Normal Bowel sounds. EXTREMITIES: No peripheral edema. MAEE. No acute deformities. SKIN: Warm and dry. No acute rashes. NEUROLOGICAL: Alert and oriented X 3. CN II-XII intact. No gross facial drooping. Strength 5/5, sensation intact. No pronator drift. Normal coordination. Gait normal.   PSYCHIATRIC: Normal mood and affect. LABS  I have reviewed all labs for this visit.    Results for orders placed or performed during the hospital encounter of 07/23/21   Urine, reflex to culture    Specimen: Urine, clean catch   Result Value Ref Range    Color, UA Yellow Straw/Yellow    Clarity, UA Clear Clear    Glucose, Ur Negative Negative mg/dL    Bilirubin Urine Negative Negative    Ketones, Urine Negative Negative mg/dL    Specific Gravity, UA 1.010 1.005 - 1.030    Blood, Urine LARGE (A) Negative    pH, UA 6.0 5.0 - 8.0    Protein, UA Negative Negative mg/dL    Urobilinogen, Urine 0.2 <2.0 E.U./dL    Nitrite, Urine Negative Negative    Leukocyte Esterase, Urine Negative Negative    Microscopic Examination YES     Urine Type NotGiven     Urine Reflex to Culture Not Indicated Microscopic Urinalysis   Result Value Ref Range    WBC, UA None seen 0 - 5 /HPF    RBC, UA  (A) 0 - 4 /HPF    Epithelial Cells, UA 2-5 0 - 5 /HPF         RADIOLOGY  X-RAYS:  I have reviewed radiologic plain film image(s). ALL OTHER NON-PLAIN FILM IMAGES SUCH AS CT, ULTRASOUND AND MRI HAVE BEEN READ BY THE RADIOLOGIST. No orders to display              Rechecks: Physical assessment performed. Post void bladder scan: Approximately 380 cc of urine retained. Dunaway catheter placed. ED COURSE/MDM  Patient seen and evaluated. Old records reviewed. Labs and imaging reviewed and results discussed with patient. Patient had noted urine retention. Dunaway catheter was placed with complete resolution of symptoms. Urinalysis does not reveal any evidence of infection. Patient was reassessed as noted above . No acute pathology was noted and pt is safe for discharge home to follow up with PCP. Plan of care discussed with patient and family. Patient and family in agreement with plan. Patient was given scripts for the following medications. I counseled patient how to take these medications. Discharge Medication List as of 7/23/2021 12:44 PM      START taking these medications    Details   fluconazole (DIFLUCAN) 150 MG tablet Take 1 tablet by mouth once for 1 dose, Disp-1 tablet, R-0Normal             CLINICAL IMPRESSION  1. Urinary retention    2. Tinea cruris        Blood pressure (!) 145/75, pulse 86, temperature 98.7 °F (37.1 °C), temperature source Oral, resp. rate 16, last menstrual period 03/27/2013, SpO2 98 %, not currently breastfeeding. DISPOSITION  Mark Barnes was discharged to home in stable condition.         Ivone Butler,   07/23/21 [id number]

## 2022-07-25 LAB
CHOLESTEROL, TOTAL: 220 MG/DL
CHOLESTEROL/HDL RATIO: 3.1
HDLC SERPL-MCNC: 70 MG/DL (ref 35–70)
LDL CHOLESTEROL CALCULATED: 137 MG/DL (ref 0–160)
NONHDLC SERPL-MCNC: NORMAL MG/DL
TRIGL SERPL-MCNC: 67 MG/DL
VLDLC SERPL CALC-MCNC: NORMAL MG/DL

## 2022-08-10 LAB
MAMMOGRAPHY, EXTERNAL: NEGATIVE
MAMMOGRAPHY, EXTERNAL: NORMAL

## 2023-08-21 SDOH — HEALTH STABILITY: PHYSICAL HEALTH
ON AVERAGE, HOW MANY DAYS PER WEEK DO YOU ENGAGE IN MODERATE TO STRENUOUS EXERCISE (LIKE A BRISK WALK)?: PATIENT DECLINED

## 2023-08-22 ENCOUNTER — OFFICE VISIT (OUTPATIENT)
Dept: FAMILY MEDICINE CLINIC | Age: 58
End: 2023-08-22

## 2023-08-22 VITALS
BODY MASS INDEX: 29.51 KG/M2 | HEIGHT: 66 IN | TEMPERATURE: 97.6 F | WEIGHT: 183.6 LBS | HEART RATE: 98 BPM | OXYGEN SATURATION: 99 % | SYSTOLIC BLOOD PRESSURE: 136 MMHG | DIASTOLIC BLOOD PRESSURE: 72 MMHG

## 2023-08-22 DIAGNOSIS — R19.7 DIARRHEA, UNSPECIFIED TYPE: Primary | ICD-10-CM

## 2023-08-22 PROBLEM — M75.82 TENDINITIS OF LEFT ROTATOR CUFF: Status: RESOLVED | Noted: 2019-04-15 | Resolved: 2023-08-22

## 2023-08-22 PROBLEM — M54.12 LEFT CERVICAL RADICULOPATHY: Status: RESOLVED | Noted: 2019-07-01 | Resolved: 2023-08-22

## 2023-08-22 PROBLEM — M50.20 PROTRUSION OF CERVICAL INTERVERTEBRAL DISC: Status: RESOLVED | Noted: 2019-07-01 | Resolved: 2023-08-22

## 2023-08-22 PROCEDURE — 99203 OFFICE O/P NEW LOW 30 MIN: CPT | Performed by: PHYSICIAN ASSISTANT

## 2023-08-22 RX ORDER — LANOLIN ALCOHOL/MO/W.PET/CERES
325 CREAM (GRAM) TOPICAL
Qty: 90 TABLET | Refills: 3 | Status: SHIPPED | OUTPATIENT
Start: 2023-08-22

## 2023-08-22 RX ORDER — DICYCLOMINE HYDROCHLORIDE 10 MG/1
10 CAPSULE ORAL 4 TIMES DAILY
Qty: 120 CAPSULE | Refills: 0 | Status: SHIPPED | OUTPATIENT
Start: 2023-08-22

## 2023-08-22 SDOH — ECONOMIC STABILITY: HOUSING INSECURITY
IN THE LAST 12 MONTHS, WAS THERE A TIME WHEN YOU DID NOT HAVE A STEADY PLACE TO SLEEP OR SLEPT IN A SHELTER (INCLUDING NOW)?: NO

## 2023-08-22 SDOH — ECONOMIC STABILITY: INCOME INSECURITY: HOW HARD IS IT FOR YOU TO PAY FOR THE VERY BASICS LIKE FOOD, HOUSING, MEDICAL CARE, AND HEATING?: NOT HARD AT ALL

## 2023-08-22 SDOH — ECONOMIC STABILITY: FOOD INSECURITY: WITHIN THE PAST 12 MONTHS, YOU WORRIED THAT YOUR FOOD WOULD RUN OUT BEFORE YOU GOT MONEY TO BUY MORE.: NEVER TRUE

## 2023-08-22 SDOH — ECONOMIC STABILITY: FOOD INSECURITY: WITHIN THE PAST 12 MONTHS, THE FOOD YOU BOUGHT JUST DIDN'T LAST AND YOU DIDN'T HAVE MONEY TO GET MORE.: NEVER TRUE

## 2023-08-22 ASSESSMENT — PATIENT HEALTH QUESTIONNAIRE - PHQ9
SUM OF ALL RESPONSES TO PHQ9 QUESTIONS 1 & 2: 0
2. FEELING DOWN, DEPRESSED OR HOPELESS: 0
SUM OF ALL RESPONSES TO PHQ QUESTIONS 1-9: 0
9. THOUGHTS THAT YOU WOULD BE BETTER OFF DEAD, OR OF HURTING YOURSELF: 0
10. IF YOU CHECKED OFF ANY PROBLEMS, HOW DIFFICULT HAVE THESE PROBLEMS MADE IT FOR YOU TO DO YOUR WORK, TAKE CARE OF THINGS AT HOME, OR GET ALONG WITH OTHER PEOPLE: 0
7. TROUBLE CONCENTRATING ON THINGS, SUCH AS READING THE NEWSPAPER OR WATCHING TELEVISION: 0
SUM OF ALL RESPONSES TO PHQ QUESTIONS 1-9: 0
1. LITTLE INTEREST OR PLEASURE IN DOING THINGS: 0
8. MOVING OR SPEAKING SO SLOWLY THAT OTHER PEOPLE COULD HAVE NOTICED. OR THE OPPOSITE, BEING SO FIGETY OR RESTLESS THAT YOU HAVE BEEN MOVING AROUND A LOT MORE THAN USUAL: 0
4. FEELING TIRED OR HAVING LITTLE ENERGY: 0
6. FEELING BAD ABOUT YOURSELF - OR THAT YOU ARE A FAILURE OR HAVE LET YOURSELF OR YOUR FAMILY DOWN: 0
5. POOR APPETITE OR OVEREATING: 0
3. TROUBLE FALLING OR STAYING ASLEEP: 0

## 2023-08-22 ASSESSMENT — ENCOUNTER SYMPTOMS
VOMITING: 0
BLOOD IN STOOL: 0
RESPIRATORY NEGATIVE: 1
ABDOMINAL DISTENTION: 1
DIARRHEA: 1
CONSTIPATION: 1
NAUSEA: 0

## 2023-08-22 NOTE — PROGRESS NOTES
Subjective:      Patient ID: Jade Braswell is a 59 y.o. female. HPI    Patient presents today to establish care. Her main concern today is intermittent diarrhea and constipation. Occurring weekly. Has tried taking pro-biotic, colon health medication otc with no real changes. Symptoms are more diarrhea than constipation. Will have 6-7 BM daily, loose stool, not watery. Nol history of this in the past. No recent travel. I have reviewed the patient's medical history in detail and updated the computerized patient record. Review of Systems   Constitutional: Negative. Respiratory: Negative. Cardiovascular: Negative. Gastrointestinal:  Positive for abdominal distention, constipation and diarrhea. Negative for blood in stool, nausea and vomiting. Genitourinary: Negative. Objective:   Physical Exam  Constitutional:       Appearance: Normal appearance. Cardiovascular:      Rate and Rhythm: Normal rate and regular rhythm. Heart sounds: Normal heart sounds. Pulmonary:      Effort: Pulmonary effort is normal.      Breath sounds: Normal breath sounds. Abdominal:      General: There is distension. Tenderness: There is abdominal tenderness. Skin:     General: Skin is warm and dry. Neurological:      General: No focal deficit present. Mental Status: She is alert and oriented to person, place, and time. Assessment / Plan:          Diagnosis Orders   1. Diarrhea, unspecified type          Will try bentyl. Discussed need for colonoscopy with the bowel changes. Will start iron supplement with review of most recent labs. To schedule CPE in near future with labs.

## 2023-09-06 ENCOUNTER — TELEPHONE (OUTPATIENT)
Dept: FAMILY MEDICINE CLINIC | Age: 58
End: 2023-09-06

## 2023-09-06 NOTE — TELEPHONE ENCOUNTER
Patient was seen on 8/22 by Marva Pepe and prescribed dicyclomine, she is asking if it is ok to take famotidine and an allergy medication while also taking this, she is worried about drug interactions.   Please advise

## 2023-10-06 ENCOUNTER — TELEPHONE (OUTPATIENT)
Dept: FAMILY MEDICINE CLINIC | Age: 58
End: 2023-10-06

## 2023-10-06 NOTE — TELEPHONE ENCOUNTER
Pt was seen as a new patient on 08/22/23 and was prescribed dicyclomine 20 mg capsule. Pt states her diarrhea has stopped, however she get constipated x2-3 times a week. Pt's stomach hurts when having constipation. Pt was wondering if she can be prescribed another medication for it?

## 2023-10-18 NOTE — TELEPHONE ENCOUNTER
No Carney is requesting refill(s) dicyclomine  Last OV 8/22/23 (pertaining to medication)  LR 8/22/23 (per medication requested)  Next office visit scheduled or attempted Yes   If no, reason:  10/31/23

## 2023-10-19 ENCOUNTER — TELEPHONE (OUTPATIENT)
Dept: FAMILY MEDICINE CLINIC | Age: 58
End: 2023-10-19

## 2023-10-19 RX ORDER — DICYCLOMINE HYDROCHLORIDE 10 MG/1
10 CAPSULE ORAL 4 TIMES DAILY
Qty: 120 CAPSULE | Refills: 0 | Status: SHIPPED | OUTPATIENT
Start: 2023-10-19

## 2023-10-19 NOTE — TELEPHONE ENCOUNTER
Jenna Lala [phone] (home)    is requesting refill(s) of medication Oxybutynin 5 mg tablet to preferred pharmacy [address] [place] [address] Rd 08/22/23 (pertaining to medication)   Last refill 06/28/23 (per medication requested)  Next office visit scheduled or attempted Yes  Date 10/31/23

## 2023-10-23 RX ORDER — OXYBUTYNIN CHLORIDE 5 MG/1
5 TABLET, EXTENDED RELEASE ORAL DAILY
Qty: 90 TABLET | Refills: 3 | Status: SHIPPED | OUTPATIENT
Start: 2023-10-23

## 2023-11-22 ENCOUNTER — TELEPHONE (OUTPATIENT)
Dept: FAMILY MEDICINE CLINIC | Age: 58
End: 2023-11-22

## 2023-11-22 NOTE — TELEPHONE ENCOUNTER
Pt had new patient appointment with Charlene Fischer on 08/22/23. Pt said that she gets heartburn often. Pt wanted to get recommendation on over the counter heartburn medication that won't have drug interaction with medications she is currently taking.

## 2023-12-29 ENCOUNTER — OFFICE VISIT (OUTPATIENT)
Dept: FAMILY MEDICINE CLINIC | Age: 58
End: 2023-12-29

## 2023-12-29 VITALS
OXYGEN SATURATION: 98 % | SYSTOLIC BLOOD PRESSURE: 124 MMHG | WEIGHT: 178.4 LBS | RESPIRATION RATE: 16 BRPM | HEART RATE: 81 BPM | DIASTOLIC BLOOD PRESSURE: 76 MMHG | HEIGHT: 65 IN | BODY MASS INDEX: 29.72 KG/M2

## 2023-12-29 DIAGNOSIS — Z00.00 ANNUAL PHYSICAL EXAM: Primary | ICD-10-CM

## 2023-12-29 LAB
ALBUMIN SERPL-MCNC: 4.3 G/DL (ref 3.4–5)
ALBUMIN/GLOB SERPL: 1.8 {RATIO} (ref 1.1–2.2)
ALP SERPL-CCNC: 51 U/L (ref 40–129)
ALT SERPL-CCNC: <5 U/L (ref 10–40)
ANION GAP SERPL CALCULATED.3IONS-SCNC: 8 MMOL/L (ref 3–16)
AST SERPL-CCNC: 11 U/L (ref 15–37)
BILIRUB SERPL-MCNC: 0.3 MG/DL (ref 0–1)
BUN SERPL-MCNC: 11 MG/DL (ref 7–20)
CALCIUM SERPL-MCNC: 8.7 MG/DL (ref 8.3–10.6)
CHLORIDE SERPL-SCNC: 103 MMOL/L (ref 99–110)
CHOLEST SERPL-MCNC: 190 MG/DL (ref 0–199)
CO2 SERPL-SCNC: 26 MMOL/L (ref 21–32)
CREAT SERPL-MCNC: 0.7 MG/DL (ref 0.6–1.2)
GFR SERPLBLD CREATININE-BSD FMLA CKD-EPI: >60 ML/MIN/{1.73_M2}
GLUCOSE SERPL-MCNC: 90 MG/DL (ref 70–99)
HDLC SERPL-MCNC: 68 MG/DL (ref 40–60)
LDLC SERPL CALC-MCNC: 109 MG/DL
POTASSIUM SERPL-SCNC: 4.7 MMOL/L (ref 3.5–5.1)
PROT SERPL-MCNC: 6.7 G/DL (ref 6.4–8.2)
SODIUM SERPL-SCNC: 137 MMOL/L (ref 136–145)
TRIGL SERPL-MCNC: 66 MG/DL (ref 0–150)
VLDLC SERPL CALC-MCNC: 13 MG/DL

## 2023-12-29 PROCEDURE — 99396 PREV VISIT EST AGE 40-64: CPT | Performed by: PHYSICIAN ASSISTANT

## 2023-12-29 PROCEDURE — 36415 COLL VENOUS BLD VENIPUNCTURE: CPT | Performed by: PHYSICIAN ASSISTANT

## 2023-12-29 NOTE — PROGRESS NOTES
History and Physical      Lobito Hartman  YOB: 1965    Date of Service:  12/29/2023    Chief Complaint:   Lobito Hartman is a 59 y.o. female who presents for complete physical examination. HPI: Overall feeling well. Wt Readings from Last 3 Encounters:   12/29/23 80.9 kg (178 lb 6.4 oz)   08/22/23 83.3 kg (183 lb 9.6 oz)   03/17/21 83.9 kg (185 lb)     BP Readings from Last 3 Encounters:   12/29/23 124/76   08/22/23 136/72   07/23/21 (!) 145/75       Patient Active Problem List   Diagnosis    Hematuria    Recurrent UTI       PREVENTIVE HEALTH:   Last eye exam:  past due  Hearing concerns: No  Last Dental exam:     past due  Caffeine use:  0/ day  Exercise:  no  Diet: feels needs improvement  Alcohol use: 0/ week  Tobacco/ Vapping/ marijuana use:  no  Drug use: no  Mental Health; concerns of anxiety or depression?  no. PHQ-9 Total Score: 0 (12/29/2023  8:21 AM)  Thoughts that you would be better off dead, or of hurting yourself in some way: 0 (12/29/2023  8:21 AM)     Perform routine skin checks? Yes  Perform monthly self breast exams?   Yes  Last Mammo:   approximate date 2/2022 and was normal  Last gyn exam:    scheduled today with GYN     Colonoscopy:   scheduled end of January  Advanced directives: yes  Immunization History   Administered Date(s) Administered    MMR, PITER RingM-R II, (age 15m+), SC, 0.5mL 12/15/1994    TDaP, DEEPAK (age 6y-58y), Mat Gray (age 10y+), IM, 0.5mL 01/01/2011       Allergies   Allergen Reactions    Amoxicillin Hives    Penicillins Hives     Current Outpatient Medications   Medication Sig Dispense Refill    dicyclomine (BENTYL) 10 MG capsule Take 1 capsule by mouth 4 times daily 120 capsule 0    oxybutynin (DITROPAN XL) 5 MG extended release tablet Take 1 tablet by mouth daily 90 tablet 3    fluticasone (FLONASE) 50 MCG/ACT nasal spray 1 spray by Nasal route daily      ferrous sulfate (FE TABS) 325 (65 Fe) MG EC tablet Take 1 tablet by mouth daily (with

## 2023-12-30 LAB
EST. AVERAGE GLUCOSE BLD GHB EST-MCNC: 99.7 MG/DL
HBA1C MFR BLD: 5.1 %

## 2024-01-06 ENCOUNTER — APPOINTMENT (OUTPATIENT)
Dept: GENERAL RADIOLOGY | Age: 59
End: 2024-01-06

## 2024-01-06 ENCOUNTER — HOSPITAL ENCOUNTER (EMERGENCY)
Age: 59
Discharge: HOME | End: 2024-01-06

## 2024-01-06 VITALS
TEMPERATURE: 98.6 F | HEART RATE: 80 BPM | RESPIRATION RATE: 16 BRPM | SYSTOLIC BLOOD PRESSURE: 134 MMHG | OXYGEN SATURATION: 96 % | DIASTOLIC BLOOD PRESSURE: 48 MMHG
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
## 2024-01-06 DIAGNOSIS — S99.912A INJURY OF LEFT ANKLE, INITIAL ENCOUNTER: ICD-10-CM

## 2024-01-06 PROCEDURE — 6370000000 HC RX 637 (ALT 250 FOR IP): Performed by: PHYSICIAN ASSISTANT

## 2024-01-06 PROCEDURE — 99283 EMERGENCY DEPT VISIT LOW MDM: CPT

## 2024-01-06 PROCEDURE — 73610 X-RAY EXAM OF ANKLE: CPT

## 2024-01-06 RX ORDER — HYDROCODONE BITARTRATE AND ACETAMINOPHEN 5; 325 MG/1; MG/1
1 TABLET ORAL ONCE
Status: COMPLETED | OUTPATIENT
Start: 2024-01-06 | End: 2024-01-06

## 2024-01-06 RX ORDER — HYDROCODONE BITARTRATE AND ACETAMINOPHEN 5; 325 MG/1; MG/1
1 TABLET ORAL EVERY 6 HOURS PRN
Qty: 10 TABLET | Refills: 0 | Status: SHIPPED | OUTPATIENT
Start: 2024-01-06 | End: 2024-01-09

## 2024-01-06 RX ADMIN — HYDROCODONE BITARTRATE AND ACETAMINOPHEN 1 TABLET: 5; 325 TABLET ORAL at 09:47

## 2024-01-06 ASSESSMENT — PAIN DESCRIPTION - LOCATION: LOCATION: ANKLE

## 2024-01-06 ASSESSMENT — PAIN SCALES - GENERAL
PAINLEVEL_OUTOF10: 8
PAINLEVEL_OUTOF10: 8

## 2024-01-06 ASSESSMENT — PAIN - FUNCTIONAL ASSESSMENT: PAIN_FUNCTIONAL_ASSESSMENT: 0-10

## 2024-01-06 ASSESSMENT — PAIN DESCRIPTION - ORIENTATION: ORIENTATION: LEFT

## 2024-01-06 NOTE — ED NOTES
Pain and swelling left ankle with increased pain with weight bearing. Distal sensation and circulation wnl.

## 2024-01-06 NOTE — ED PROVIDER NOTES
education level: Not on file   Occupational History    Not on file   Tobacco Use    Smoking status: Never    Smokeless tobacco: Never   Vaping Use    Vaping Use: Never used   Substance and Sexual Activity    Alcohol use: No    Drug use: No    Sexual activity: Not Currently   Other Topics Concern    Not on file   Social History Narrative    Not on file     Social Determinants of Health     Financial Resource Strain: Low Risk  (8/22/2023)    Overall Financial Resource Strain (CARDIA)     Difficulty of Paying Living Expenses: Not hard at all   Food Insecurity: Not on file (8/22/2023)   Transportation Needs: Unknown (8/22/2023)    PRAPARE - Transportation     Lack of Transportation (Medical): Not on file     Lack of Transportation (Non-Medical): No   Physical Activity: Unknown (8/21/2023)    Exercise Vital Sign     Days of Exercise per Week: Patient declined     Minutes of Exercise per Session: Not on file   Stress: Not on file   Social Connections: Not on file   Intimate Partner Violence: Not At Risk (8/21/2023)    Humiliation, Afraid, Rape, and Kick questionnaire     Fear of Current or Ex-Partner: No     Emotionally Abused: No     Physically Abused: No     Sexually Abused: No   Housing Stability: Unknown (8/22/2023)    Housing Stability Vital Sign     Unable to Pay for Housing in the Last Year: Not on file     Number of Places Lived in the Last Year: Not on file     Unstable Housing in the Last Year: No     Current Medications:  Current Facility-Administered Medications   Medication Dose Route Frequency Provider Last Rate Last Admin    HYDROcodone-acetaminophen (NORCO) 5-325 MG per tablet 1 tablet  1 tablet Oral Once Naseem Warern PA         Current Outpatient Medications   Medication Sig Dispense Refill    HYDROcodone-acetaminophen (NORCO) 5-325 MG per tablet Take 1 tablet by mouth every 6 hours as needed for Pain for up to 3 days. Intended supply: 3 days. Take lowest dose possible to manage pain Max Daily Amount:  [address]  [phone]    If symptoms worsen    Discharge Medications:   New Prescriptions    HYDROCODONE-ACETAMINOPHEN (NORCO) 5-325 MG PER TABLET    Take 1 tablet by mouth every 6 hours as needed for Pain for up to 3 days. Intended supply: 3 days. Take lowest dose possible to manage pain Max Daily Amount: 4 tablets     Discontinued Medications:  Discontinued Medications    No medications on file     Risk management discussed and shared decision making had with patient and/or surrogate. All questions were answered. Patient will follow up with Ortho within 2 to 3 days for further evaluation/treatment.  All questions answered.  Patient will return to ED for new/worsening symptoms.    DISPOSITION:  Patient was discharged home in stable condition.    (Please note that portions of this note were completed with a voice recognition program.  Efforts were made to edit the dictations but occasionally words are mis-transcribed).          Naseem Warren PA  01/06/24 0944

## 2024-02-16 RX ORDER — DICYCLOMINE HYDROCHLORIDE 10 MG/1
10 CAPSULE ORAL 4 TIMES DAILY
Qty: 120 CAPSULE | Refills: 0 | Status: SHIPPED | OUTPATIENT
Start: 2024-02-16

## 2024-02-16 NOTE — TELEPHONE ENCOUNTER
Bhumi Covarrubias is requesting refill(s) dicyclomine  Last OV 12/29/23 (pertaining to medication)  LR 12/21/23 (per medication requested)  Next office visit scheduled or attempted No   If no, reason:

## 2024-04-17 NOTE — TELEPHONE ENCOUNTER
Bhumi Covarrubias is requesting refill(s) dicyclomine  Last OV 12/29/23 (pertaining to medication)  LR 2/16/24 (per medication requested)  Next office visit scheduled or attempted No   If no, reason:

## 2024-04-18 RX ORDER — DICYCLOMINE HYDROCHLORIDE 10 MG/1
10 CAPSULE ORAL 4 TIMES DAILY
Qty: 120 CAPSULE | Refills: 0 | Status: SHIPPED | OUTPATIENT
Start: 2024-04-18

## 2024-05-30 ENCOUNTER — OFFICE VISIT (OUTPATIENT)
Dept: FAMILY MEDICINE CLINIC | Age: 59
End: 2024-05-30
Payer: COMMERCIAL

## 2024-05-30 ENCOUNTER — TELEPHONE (OUTPATIENT)
Dept: FAMILY MEDICINE CLINIC | Age: 59
End: 2024-05-30

## 2024-05-30 VITALS
OXYGEN SATURATION: 100 % | HEART RATE: 78 BPM | HEIGHT: 66 IN | WEIGHT: 178.2 LBS | DIASTOLIC BLOOD PRESSURE: 74 MMHG | SYSTOLIC BLOOD PRESSURE: 128 MMHG | BODY MASS INDEX: 28.64 KG/M2

## 2024-05-30 DIAGNOSIS — R19.7 DIARRHEA, UNSPECIFIED TYPE: ICD-10-CM

## 2024-05-30 DIAGNOSIS — N39.46 MIXED STRESS AND URGE URINARY INCONTINENCE: ICD-10-CM

## 2024-05-30 DIAGNOSIS — Z12.11 SCREENING FOR COLON CANCER: ICD-10-CM

## 2024-05-30 DIAGNOSIS — Z80.3 FAMILY HISTORY OF BREAST CANCER: ICD-10-CM

## 2024-05-30 DIAGNOSIS — J30.2 SEASONAL ALLERGIES: Primary | ICD-10-CM

## 2024-05-30 PROCEDURE — 3017F COLORECTAL CA SCREEN DOC REV: CPT | Performed by: STUDENT IN AN ORGANIZED HEALTH CARE EDUCATION/TRAINING PROGRAM

## 2024-05-30 PROCEDURE — 90471 IMMUNIZATION ADMIN: CPT | Performed by: STUDENT IN AN ORGANIZED HEALTH CARE EDUCATION/TRAINING PROGRAM

## 2024-05-30 PROCEDURE — 90715 TDAP VACCINE 7 YRS/> IM: CPT | Performed by: STUDENT IN AN ORGANIZED HEALTH CARE EDUCATION/TRAINING PROGRAM

## 2024-05-30 PROCEDURE — G8427 DOCREV CUR MEDS BY ELIG CLIN: HCPCS | Performed by: STUDENT IN AN ORGANIZED HEALTH CARE EDUCATION/TRAINING PROGRAM

## 2024-05-30 PROCEDURE — 99214 OFFICE O/P EST MOD 30 MIN: CPT | Performed by: STUDENT IN AN ORGANIZED HEALTH CARE EDUCATION/TRAINING PROGRAM

## 2024-05-30 PROCEDURE — G8419 CALC BMI OUT NRM PARAM NOF/U: HCPCS | Performed by: STUDENT IN AN ORGANIZED HEALTH CARE EDUCATION/TRAINING PROGRAM

## 2024-05-30 PROCEDURE — 1036F TOBACCO NON-USER: CPT | Performed by: STUDENT IN AN ORGANIZED HEALTH CARE EDUCATION/TRAINING PROGRAM

## 2024-05-30 RX ORDER — AZELASTINE 1 MG/ML
1 SPRAY, METERED NASAL 2 TIMES DAILY
Qty: 60 ML | Refills: 1 | Status: SHIPPED | OUTPATIENT
Start: 2024-05-30

## 2024-05-30 ASSESSMENT — ENCOUNTER SYMPTOMS
COUGH: 1
RHINORRHEA: 0
BLOOD IN STOOL: 0
EYE ITCHING: 1
SHORTNESS OF BREATH: 0

## 2024-05-30 ASSESSMENT — PATIENT HEALTH QUESTIONNAIRE - PHQ9
10. IF YOU CHECKED OFF ANY PROBLEMS, HOW DIFFICULT HAVE THESE PROBLEMS MADE IT FOR YOU TO DO YOUR WORK, TAKE CARE OF THINGS AT HOME, OR GET ALONG WITH OTHER PEOPLE: NOT DIFFICULT AT ALL
SUM OF ALL RESPONSES TO PHQ QUESTIONS 1-9: 0
SUM OF ALL RESPONSES TO PHQ9 QUESTIONS 1 & 2: 0
1. LITTLE INTEREST OR PLEASURE IN DOING THINGS: NOT AT ALL
8. MOVING OR SPEAKING SO SLOWLY THAT OTHER PEOPLE COULD HAVE NOTICED. OR THE OPPOSITE, BEING SO FIGETY OR RESTLESS THAT YOU HAVE BEEN MOVING AROUND A LOT MORE THAN USUAL: NOT AT ALL
4. FEELING TIRED OR HAVING LITTLE ENERGY: NOT AT ALL
SUM OF ALL RESPONSES TO PHQ QUESTIONS 1-9: 0
SUM OF ALL RESPONSES TO PHQ QUESTIONS 1-9: 0
7. TROUBLE CONCENTRATING ON THINGS, SUCH AS READING THE NEWSPAPER OR WATCHING TELEVISION: NOT AT ALL
5. POOR APPETITE OR OVEREATING: NOT AT ALL
6. FEELING BAD ABOUT YOURSELF - OR THAT YOU ARE A FAILURE OR HAVE LET YOURSELF OR YOUR FAMILY DOWN: NOT AT ALL
SUM OF ALL RESPONSES TO PHQ QUESTIONS 1-9: 0
2. FEELING DOWN, DEPRESSED OR HOPELESS: NOT AT ALL
3. TROUBLE FALLING OR STAYING ASLEEP: NOT AT ALL
9. THOUGHTS THAT YOU WOULD BE BETTER OFF DEAD, OR OF HURTING YOURSELF: NOT AT ALL

## 2024-05-30 NOTE — PROGRESS NOTES
[facility]  Establish care visit   2024    Bhumi Covarrubias (:  1965) is a 58 y.o. female, here to establish care.    Chief Complaint   Patient presents with    New Patient     Patient here to [facility] care         ASSESSMENT/ PLAN  1. Seasonal allergies  -Start azelastine and follow-up if symptoms do not improve  - azelastine (ASTELIN) 0.1 % nasal spray; 1 spray by Nasal route 2 times daily Use in each nostril as directed  Dispense: 60 mL; Refill: 1    2. Diarrhea, unspecified type  -Continue Bentyl twice daily.  On days that you are having constipation okay to add fiber.  Also discussed fiber would be an okay thing to take daily which would help with diarrhea and constipation.    3. Mixed stress and urge urinary incontinence  -Continue oxybutynin    4. Screening for colon cancer  [facility]    5. Family history of breast cancer  - [facility] Genetic Risk Evaluation & Testing Program (Cancer Only)       Return for Schedule physical in December.    HPI  Patient is here to establish care.  She has a history of alternating diarrhea and constipation.  She has not had any diarrhea since taking Bentyl twice a day.  She has constipation occasionally.  She has not had any recently.  She has not tried taking fiber for this.  She has a history of incontinence which she notices when she sneezes or coughs.  She also has urge incontinence, associated with frequency and urgency.  Oxybutynin is alleviating.  She is due for colonoscopy and would like a referral to GI.  She has a family history of breast cancer in multiple relatives.  She has not been genetically screened and would be open to it if it is something that insurance covers.  She has been having sneezing, itchy eyes, and cough.  She has been taking Mucinex which has not been alleviating.    ROS  Review of Systems   Constitutional:  Negative for chills and fever.   HENT:  Positive for

## 2024-06-04 NOTE — TELEPHONE ENCOUNTER
DENIAL for Azelastine HCl 137MCG/SPRAY solution; letter attached.    If this requires a response please respond to the pool ( P MHCX PSC MEDICATION PRE-AUTH).      Thank you please advise patient.    
L/m for patient to return call   
PA required for Azelastine HCL 137MCG  Key: [id number]  
Pt informed.   
Submitted PA for Azelastine HCl 137MCG/SPRAY solution  Via CM Key: [id number] STATUS: PENDING.    Follow up done daily; if no decision with in three days we will refax.  If another three days goes by with no decision will call the insurance for status.    
gradual onset

## 2024-06-12 RX ORDER — DICYCLOMINE HYDROCHLORIDE 10 MG/1
10 CAPSULE ORAL 4 TIMES DAILY
Qty: 120 CAPSULE | Refills: 0 | Status: SHIPPED | OUTPATIENT
Start: 2024-06-12

## 2024-06-12 NOTE — TELEPHONE ENCOUNTER
Refill Request       Last Seen: Last Seen Department: 5/30/2024  Last Seen by PCP: 5/30/2024    Last Written: 04/18/2024      Next Appointment:   No future appointments.        Requested Prescriptions     Pending Prescriptions Disp Refills    dicyclomine (BENTYL) 10 MG capsule 120 capsule 0     Sig: Take 1 capsule by mouth 4 times daily

## 2024-08-13 RX ORDER — DICYCLOMINE HYDROCHLORIDE 10 MG/1
10 CAPSULE ORAL 4 TIMES DAILY
Qty: 120 CAPSULE | Refills: 0 | Status: SHIPPED | OUTPATIENT
Start: 2024-08-13

## 2024-08-28 ENCOUNTER — OFFICE VISIT (OUTPATIENT)
Dept: FAMILY MEDICINE CLINIC | Age: 59
End: 2024-08-28
Payer: COMMERCIAL

## 2024-08-28 VITALS
BODY MASS INDEX: 28.73 KG/M2 | SYSTOLIC BLOOD PRESSURE: 132 MMHG | OXYGEN SATURATION: 98 % | DIASTOLIC BLOOD PRESSURE: 84 MMHG | HEART RATE: 90 BPM | WEIGHT: 178 LBS

## 2024-08-28 DIAGNOSIS — J02.9 ACUTE PHARYNGITIS, UNSPECIFIED ETIOLOGY: Primary | ICD-10-CM

## 2024-08-28 DIAGNOSIS — J02.0 ACUTE STREPTOCOCCAL PHARYNGITIS: ICD-10-CM

## 2024-08-28 DIAGNOSIS — J06.9 VIRAL UPPER RESPIRATORY TRACT INFECTION: ICD-10-CM

## 2024-08-28 LAB
INFLUENZA A ANTIGEN, POC: NEGATIVE
INFLUENZA B ANTIGEN, POC: NEGATIVE
LOT EXPIRE DATE: NORMAL
LOT KIT NUMBER: NORMAL
S PYO AG THROAT QL: POSITIVE
SARS-COV-2, POC: NORMAL
VALID INTERNAL CONTROL: YES
VENDOR AND KIT NAME POC: NORMAL

## 2024-08-28 PROCEDURE — 3017F COLORECTAL CA SCREEN DOC REV: CPT | Performed by: STUDENT IN AN ORGANIZED HEALTH CARE EDUCATION/TRAINING PROGRAM

## 2024-08-28 PROCEDURE — G8427 DOCREV CUR MEDS BY ELIG CLIN: HCPCS | Performed by: STUDENT IN AN ORGANIZED HEALTH CARE EDUCATION/TRAINING PROGRAM

## 2024-08-28 PROCEDURE — G8419 CALC BMI OUT NRM PARAM NOF/U: HCPCS | Performed by: STUDENT IN AN ORGANIZED HEALTH CARE EDUCATION/TRAINING PROGRAM

## 2024-08-28 PROCEDURE — 1036F TOBACCO NON-USER: CPT | Performed by: STUDENT IN AN ORGANIZED HEALTH CARE EDUCATION/TRAINING PROGRAM

## 2024-08-28 PROCEDURE — 99213 OFFICE O/P EST LOW 20 MIN: CPT | Performed by: STUDENT IN AN ORGANIZED HEALTH CARE EDUCATION/TRAINING PROGRAM

## 2024-08-28 PROCEDURE — 87880 STREP A ASSAY W/OPTIC: CPT | Performed by: STUDENT IN AN ORGANIZED HEALTH CARE EDUCATION/TRAINING PROGRAM

## 2024-08-28 PROCEDURE — 87428 SARSCOV & INF VIR A&B AG IA: CPT | Performed by: STUDENT IN AN ORGANIZED HEALTH CARE EDUCATION/TRAINING PROGRAM

## 2024-08-28 RX ORDER — CEFDINIR 300 MG/1
300 CAPSULE ORAL 2 TIMES DAILY
Qty: 14 CAPSULE | Refills: 0 | Status: SHIPPED | OUTPATIENT
Start: 2024-08-28 | End: 2024-09-04

## 2024-08-28 SDOH — ECONOMIC STABILITY: FOOD INSECURITY: WITHIN THE PAST 12 MONTHS, THE FOOD YOU BOUGHT JUST DIDN'T LAST AND YOU DIDN'T HAVE MONEY TO GET MORE.: NEVER TRUE

## 2024-08-28 SDOH — ECONOMIC STABILITY: FOOD INSECURITY: WITHIN THE PAST 12 MONTHS, YOU WORRIED THAT YOUR FOOD WOULD RUN OUT BEFORE YOU GOT MONEY TO BUY MORE.: NEVER TRUE

## 2024-08-28 SDOH — ECONOMIC STABILITY: INCOME INSECURITY: HOW HARD IS IT FOR YOU TO PAY FOR THE VERY BASICS LIKE FOOD, HOUSING, MEDICAL CARE, AND HEATING?: NOT HARD AT ALL

## 2024-08-28 NOTE — PROGRESS NOTES
[facility]  2024    Bhumi Covarrubisa (:  1965) is a 58 y.o. female, here for evaluation of the following medical concerns:    Chief Complaint   Patient presents with    Pharyngitis     X 5 days     Nasal Congestion        ASSESSMENT/ PLAN  1. Acute pharyngitis, unspecified etiology  -COVID and flu test were negative.  Strep test was positive.  Start cefdinir as she is allergic to penicillin.  She reports no breathing issues with penicillin.  She thinks she has had Keflex or cefdinir previously.  - POCT rapid strep A  - POCT COVID-19 & Influenza A/B    2. Viral upper respiratory tract infection  -Rest, fluids, vitamin C, vitamin D, zinc  - POCT rapid strep A  - POCT COVID-19 & Influenza A/B    3. Acute streptococcal pharyngitis  -See plan for acute pharyngitis above  - cefdinir (OMNICEF) 300 MG capsule; Take 1 capsule by mouth 2 times daily for 7 days  Dispense: 14 capsule; Refill: 0       No follow-ups on file.    HPI  Bhumi Covarrubias symptoms started on Thursday.  Tried Mucinex, alexandr's sinus nose spray.  Has been using tylenol.  Bought vitamins but hasn't tried them yet.  Started with rhinorrhea and sinus pressure.  Turned into severe sore throat Friday.  Sinus pressure worsening day to day.  No fevers.  Has had some chills.  No nausea, vomiting, or diarrhea.  Eating okay.  Works in  but no known exposures.  Son has started to have slight cough.  Ears are hurting and feels a whooshing noise that is there most of the time.      ROS  -See HPI    HISTORIES  Current Outpatient Medications on File Prior to Visit   Medication Sig Dispense Refill    dicyclomine (BENTYL) 10 MG capsule Take 1 capsule by mouth 4 times daily 120 capsule 0    azelastine (ASTELIN) 0.1 % nasal spray 1 spray by Nasal route 2 times daily Use in each nostril as directed 60 mL 1    oxybutynin (DITROPAN XL) 5 MG extended release tablet Take 1 tablet by mouth daily 90 tablet 3     No current

## 2024-09-04 ENCOUNTER — TELEPHONE (OUTPATIENT)
Dept: FAMILY MEDICINE CLINIC | Age: 59
End: 2024-09-04

## 2024-09-04 DIAGNOSIS — J32.9 RECURRENT SINUS INFECTIONS: Primary | ICD-10-CM

## 2024-09-04 NOTE — TELEPHONE ENCOUNTER
Patient is requesting a referral to ENT. She stated that she has reoccurring sinus infections and the nose spray does not work.

## 2024-09-12 ENCOUNTER — OFFICE VISIT (OUTPATIENT)
Dept: ENT CLINIC | Age: 59
End: 2024-09-12
Payer: COMMERCIAL

## 2024-09-12 VITALS
DIASTOLIC BLOOD PRESSURE: 73 MMHG | BODY MASS INDEX: 28.61 KG/M2 | WEIGHT: 178 LBS | SYSTOLIC BLOOD PRESSURE: 124 MMHG | HEART RATE: 106 BPM | HEIGHT: 66 IN

## 2024-09-12 DIAGNOSIS — J34.2 DEVIATED NASAL SEPTUM: ICD-10-CM

## 2024-09-12 DIAGNOSIS — J01.40 ACUTE NON-RECURRENT PANSINUSITIS: Primary | ICD-10-CM

## 2024-09-12 DIAGNOSIS — J30.2 SEASONAL ALLERGIES: ICD-10-CM

## 2024-09-12 PROCEDURE — 1036F TOBACCO NON-USER: CPT | Performed by: STUDENT IN AN ORGANIZED HEALTH CARE EDUCATION/TRAINING PROGRAM

## 2024-09-12 PROCEDURE — 99204 OFFICE O/P NEW MOD 45 MIN: CPT | Performed by: STUDENT IN AN ORGANIZED HEALTH CARE EDUCATION/TRAINING PROGRAM

## 2024-09-12 PROCEDURE — 3017F COLORECTAL CA SCREEN DOC REV: CPT | Performed by: STUDENT IN AN ORGANIZED HEALTH CARE EDUCATION/TRAINING PROGRAM

## 2024-09-12 PROCEDURE — G8427 DOCREV CUR MEDS BY ELIG CLIN: HCPCS | Performed by: STUDENT IN AN ORGANIZED HEALTH CARE EDUCATION/TRAINING PROGRAM

## 2024-09-12 PROCEDURE — 31231 NASAL ENDOSCOPY DX: CPT | Performed by: STUDENT IN AN ORGANIZED HEALTH CARE EDUCATION/TRAINING PROGRAM

## 2024-09-12 PROCEDURE — G8419 CALC BMI OUT NRM PARAM NOF/U: HCPCS | Performed by: STUDENT IN AN ORGANIZED HEALTH CARE EDUCATION/TRAINING PROGRAM

## 2024-09-12 RX ORDER — FLUTICASONE PROPIONATE 50 MCG
1 SPRAY, SUSPENSION (ML) NASAL DAILY
COMMUNITY

## 2024-09-12 RX ORDER — AZELASTINE 1 MG/ML
1 SPRAY, METERED NASAL 2 TIMES DAILY
Qty: 30 ML | Refills: 3 | Status: SHIPPED | OUTPATIENT
Start: 2024-09-12

## 2024-09-12 RX ORDER — METHYLPREDNISOLONE 4 MG
TABLET, DOSE PACK ORAL
Qty: 1 KIT | Refills: 0 | Status: SHIPPED | OUTPATIENT
Start: 2024-09-12 | End: 2024-09-18

## 2024-09-12 RX ORDER — DOXYCYCLINE 100 MG/1
100 CAPSULE ORAL 2 TIMES DAILY
Qty: 14 CAPSULE | Refills: 0 | Status: SHIPPED | OUTPATIENT
Start: 2024-09-12 | End: 2024-09-19

## 2024-09-12 ASSESSMENT — ENCOUNTER SYMPTOMS
NAUSEA: 0
VOMITING: 0
COUGH: 0
SINUS PRESSURE: 1
RHINORRHEA: 0
SINUS PAIN: 1
SHORTNESS OF BREATH: 0
EYE PAIN: 0

## 2024-09-19 RX ORDER — OXYBUTYNIN CHLORIDE 5 MG/1
5 TABLET, EXTENDED RELEASE ORAL DAILY
Qty: 90 TABLET | Refills: 1 | Status: SHIPPED | OUTPATIENT
Start: 2024-09-19

## 2024-10-16 ASSESSMENT — ENCOUNTER SYMPTOMS
NAUSEA: 0
VOMITING: 0
EYE PAIN: 0
SINUS PRESSURE: 1
SINUS PAIN: 1
RHINORRHEA: 0
COUGH: 0
SHORTNESS OF BREATH: 0

## 2024-10-16 NOTE — PROGRESS NOTES
[facility]  DIVISION OF OTOLARYNGOLOGY- HEAD & NECK SURGERY  CONSULT    Patient Name: Bhumi Covarrubias  Medical Record Number:  [id number]  Primary Care Physician:  Matthew Luo DO  Date of Consultation: 10/17/2024    Chief Complaint:   Chief Complaint   Patient presents with    Sinus Problem     1m follow up sinus       HISTORY OF PRESENT ILLNESS  Bhumi is a(n) 59 y.o. female who presents for evaluation of recurrent sinusitis.  She states that she gets multiple sinus infections a year requiring treatment.  She sometimes has antibiotics and she sometimes treats with over-the-counter medications.  Symptoms typically include facial pain and pressure and nasal drainage.  She states that the last 2 months has been having significant facial pain and pressure with discolored mucus.  She is on Astelin and Flonase as well as an antihistamine.    Interval History 10/17/24  Bhumi says that she had improvement in her symptoms when she was on the antibiotics and steroids.  She has not noticed much of her with his Astelin.  She is having gagging sensations and occasional vomiting when she lies down.  Patient Active Problem List   Diagnosis    Hematuria    Recurrent UTI     Past Surgical History:   Procedure Laterality Date    ECTOPIC PREGNANCY SURGERY      TONSILLECTOMY       Family History   Problem Relation Age of Onset    Breast Cancer Mother     Cancer Father         prostate    Breast Cancer Sister     Breast Cancer Sister     Breast Cancer Sister      Social History     Socioeconomic History    Marital status: Single     Spouse name: Not on file    Number of children: Not on file    Years of education: Not on file    Highest education level: Not on file   Occupational History    Not on file   Tobacco Use    Smoking status: Never    Smokeless tobacco: Never   Vaping Use    Vaping status: Never Used   Substance and Sexual Activity    Alcohol use: No    Drug use: No    Sexual activity: Not Currently   Other

## 2024-10-17 ENCOUNTER — TELEPHONE (OUTPATIENT)
Dept: FAMILY MEDICINE CLINIC | Age: 59
End: 2024-10-17

## 2024-10-17 ENCOUNTER — OFFICE VISIT (OUTPATIENT)
Dept: ENT CLINIC | Age: 59
End: 2024-10-17
Payer: COMMERCIAL

## 2024-10-17 VITALS
BODY MASS INDEX: 29.03 KG/M2 | DIASTOLIC BLOOD PRESSURE: 80 MMHG | WEIGHT: 185 LBS | SYSTOLIC BLOOD PRESSURE: 143 MMHG | HEIGHT: 67 IN | HEART RATE: 77 BPM

## 2024-10-17 DIAGNOSIS — J01.40 ACUTE NON-RECURRENT PANSINUSITIS: ICD-10-CM

## 2024-10-17 DIAGNOSIS — K21.9 LARYNGOPHARYNGEAL REFLUX (LPR): Primary | ICD-10-CM

## 2024-10-17 DIAGNOSIS — J30.2 SEASONAL ALLERGIES: ICD-10-CM

## 2024-10-17 DIAGNOSIS — Z12.11 SCREENING FOR COLON CANCER: Primary | ICD-10-CM

## 2024-10-17 PROCEDURE — G8427 DOCREV CUR MEDS BY ELIG CLIN: HCPCS | Performed by: STUDENT IN AN ORGANIZED HEALTH CARE EDUCATION/TRAINING PROGRAM

## 2024-10-17 PROCEDURE — 99213 OFFICE O/P EST LOW 20 MIN: CPT | Performed by: STUDENT IN AN ORGANIZED HEALTH CARE EDUCATION/TRAINING PROGRAM

## 2024-10-17 PROCEDURE — 3017F COLORECTAL CA SCREEN DOC REV: CPT | Performed by: STUDENT IN AN ORGANIZED HEALTH CARE EDUCATION/TRAINING PROGRAM

## 2024-10-17 PROCEDURE — G8419 CALC BMI OUT NRM PARAM NOF/U: HCPCS | Performed by: STUDENT IN AN ORGANIZED HEALTH CARE EDUCATION/TRAINING PROGRAM

## 2024-10-17 PROCEDURE — 1036F TOBACCO NON-USER: CPT | Performed by: STUDENT IN AN ORGANIZED HEALTH CARE EDUCATION/TRAINING PROGRAM

## 2024-10-17 PROCEDURE — G8484 FLU IMMUNIZE NO ADMIN: HCPCS | Performed by: STUDENT IN AN ORGANIZED HEALTH CARE EDUCATION/TRAINING PROGRAM

## 2024-10-17 RX ORDER — DICYCLOMINE HYDROCHLORIDE 10 MG/1
10 CAPSULE ORAL 4 TIMES DAILY
Qty: 120 CAPSULE | Refills: 0 | Status: SHIPPED | OUTPATIENT
Start: 2024-10-17

## 2024-10-17 NOTE — TELEPHONE ENCOUNTER
Bhumi SHAREE Huller [phone] (home)    is requesting refill(s) of medication Dicyclomine 10mg to preferred pharmacy [facility]    Last OV 5/30/24 (pertaining to medication)   Last refill 8/13/24 (per medication requested)  Next office visit scheduled or attempted No

## 2024-11-03 LAB — NONINV COLON CA DNA+OCC BLD SCRN STL QL: NEGATIVE

## 2024-12-12 RX ORDER — DICYCLOMINE HYDROCHLORIDE 10 MG/1
10 CAPSULE ORAL 4 TIMES DAILY
Qty: 120 CAPSULE | Refills: 0 | Status: SHIPPED | OUTPATIENT
Start: 2024-12-12

## 2025-02-10 RX ORDER — DICYCLOMINE HYDROCHLORIDE 10 MG/1
10 CAPSULE ORAL 4 TIMES DAILY
Qty: 120 CAPSULE | Refills: 0 | Status: SHIPPED | OUTPATIENT
Start: 2025-02-10

## 2025-02-17 ENCOUNTER — TELEPHONE (OUTPATIENT)
Dept: FAMILY MEDICINE CLINIC | Age: 60
End: 2025-02-17

## 2025-02-17 DIAGNOSIS — R92.8 ABNORMAL MAMMOGRAM OF RIGHT BREAST: Primary | ICD-10-CM

## 2025-02-17 NOTE — TELEPHONE ENCOUNTER
[facility] and would like an   Order for a core BX of right breast. Patient had an abnormal mammo on 2/14/25. Patient would like to complete this on 2/28/25.    Patient is est care on 5/9/25 with JEET Flores    Nurse would like a Call back at [phone] in [facility]

## 2025-04-06 SDOH — HEALTH STABILITY: PHYSICAL HEALTH: ON AVERAGE, HOW MANY DAYS PER WEEK DO YOU ENGAGE IN MODERATE TO STRENUOUS EXERCISE (LIKE A BRISK WALK)?: 0 DAYS

## 2025-04-06 SDOH — HEALTH STABILITY: PHYSICAL HEALTH: ON AVERAGE, HOW MANY MINUTES DO YOU ENGAGE IN EXERCISE AT THIS LEVEL?: 0 MIN

## 2025-04-09 ENCOUNTER — OFFICE VISIT (OUTPATIENT)
Dept: FAMILY MEDICINE CLINIC | Age: 60
End: 2025-04-09
Payer: COMMERCIAL

## 2025-04-09 VITALS
OXYGEN SATURATION: 99 % | BODY MASS INDEX: 25.83 KG/M2 | DIASTOLIC BLOOD PRESSURE: 84 MMHG | SYSTOLIC BLOOD PRESSURE: 130 MMHG | HEIGHT: 67 IN | HEART RATE: 88 BPM | WEIGHT: 164.6 LBS

## 2025-04-09 DIAGNOSIS — Z76.89 ENCOUNTER TO ESTABLISH CARE: ICD-10-CM

## 2025-04-09 DIAGNOSIS — R05.2 SUBACUTE COUGH: Primary | ICD-10-CM

## 2025-04-09 DIAGNOSIS — K58.9 IRRITABLE BOWEL SYNDROME, UNSPECIFIED TYPE: ICD-10-CM

## 2025-04-09 DIAGNOSIS — N39.46 MIXED STRESS AND URGE URINARY INCONTINENCE: ICD-10-CM

## 2025-04-09 PROCEDURE — 99213 OFFICE O/P EST LOW 20 MIN: CPT | Performed by: NURSE PRACTITIONER

## 2025-04-09 PROCEDURE — 3017F COLORECTAL CA SCREEN DOC REV: CPT | Performed by: NURSE PRACTITIONER

## 2025-04-09 PROCEDURE — G8419 CALC BMI OUT NRM PARAM NOF/U: HCPCS | Performed by: NURSE PRACTITIONER

## 2025-04-09 PROCEDURE — G8427 DOCREV CUR MEDS BY ELIG CLIN: HCPCS | Performed by: NURSE PRACTITIONER

## 2025-04-09 PROCEDURE — 1036F TOBACCO NON-USER: CPT | Performed by: NURSE PRACTITIONER

## 2025-04-09 RX ORDER — DICYCLOMINE HYDROCHLORIDE 10 MG/1
10 CAPSULE ORAL 4 TIMES DAILY
Qty: 120 CAPSULE | Refills: 0 | Status: SHIPPED | OUTPATIENT
Start: 2025-04-09

## 2025-04-09 RX ORDER — BENZONATATE 100 MG/1
100 CAPSULE ORAL 3 TIMES DAILY PRN
Qty: 30 CAPSULE | Refills: 0 | Status: SHIPPED | OUTPATIENT
Start: 2025-04-09 | End: 2025-04-19

## 2025-04-09 RX ORDER — DOXYCYCLINE HYCLATE 100 MG
100 TABLET ORAL 2 TIMES DAILY
Qty: 20 TABLET | Refills: 0 | Status: SHIPPED | OUTPATIENT
Start: 2025-04-09 | End: 2025-04-19

## 2025-04-09 RX ORDER — OXYBUTYNIN CHLORIDE 5 MG/1
5 TABLET, EXTENDED RELEASE ORAL DAILY
Qty: 90 TABLET | Refills: 0 | Status: SHIPPED | OUTPATIENT
Start: 2025-04-09

## 2025-04-09 SDOH — ECONOMIC STABILITY: FOOD INSECURITY: WITHIN THE PAST 12 MONTHS, THE FOOD YOU BOUGHT JUST DIDN'T LAST AND YOU DIDN'T HAVE MONEY TO GET MORE.: NEVER TRUE

## 2025-04-09 SDOH — ECONOMIC STABILITY: FOOD INSECURITY: WITHIN THE PAST 12 MONTHS, YOU WORRIED THAT YOUR FOOD WOULD RUN OUT BEFORE YOU GOT MONEY TO BUY MORE.: NEVER TRUE

## 2025-04-09 ASSESSMENT — PATIENT HEALTH QUESTIONNAIRE - PHQ9
SUM OF ALL RESPONSES TO PHQ QUESTIONS 1-9: 0
SUM OF ALL RESPONSES TO PHQ QUESTIONS 1-9: 0
2. FEELING DOWN, DEPRESSED OR HOPELESS: NOT AT ALL
SUM OF ALL RESPONSES TO PHQ QUESTIONS 1-9: 0
1. LITTLE INTEREST OR PLEASURE IN DOING THINGS: NOT AT ALL
SUM OF ALL RESPONSES TO PHQ QUESTIONS 1-9: 0

## 2025-04-09 ASSESSMENT — ENCOUNTER SYMPTOMS
WHEEZING: 0
SHORTNESS OF BREATH: 0
COUGH: 1
VOMITING: 0
CONSTIPATION: 0
NAUSEA: 0
DIARRHEA: 0

## 2025-04-09 NOTE — ASSESSMENT & PLAN NOTE
Please complete all medications as directed.  Cough and deep breath every 15 minutes you are awake.  May take OTC medications for symptom management use heart sensitive decongestants only if you have hypertension.  Increase fluids you need to be voiding clear yellow urine every 4 hours.  Limit caffeine and soda, try to eat wholesome foods.  If you experience any sob cp difficulty breathing seek higher level of care  Return or call office for any additional concerns or questions.

## 2025-04-09 NOTE — PROGRESS NOTES
PROGRESS NOTE  Date of Service:  4/9/2025    SUBJECTIVE:  Patient ID: Bhumi Covarrubias is a 59 y.o. female    HPI: new patient exam  Former pt of Dr Luo  Labs reviewed last labs 12/23 will need new labs for  continuation of care   Meds reviewed  CDM reviewed   Recent Flu A resolved other than cough tried  inhaler which increased heart rate  No current fever  Cough is productive phlegm mainly   No second hand smoke  No sob no cp at rest  No concerns for n/v/d  No wheezing noted      Past Medical History:   Diagnosis Date    Acute UTI     Allergic rhinitis     Asthma     Cervical spine pain 04/15/2019    GERD (gastroesophageal reflux disease) 7-2023    Diarrhea and constipation    Irritable bowel syndrome 4/9/2025    Left cervical radiculopathy     Protrusion of cervical intervertebral disc 07/01/2019    Urinary incontinence Not sure    On oxybutin      Past Surgical History:   Procedure Laterality Date    ECTOPIC PREGNANCY SURGERY      [facility] US GUID NDL BIOPSY RIGHT Right 2/24/2025    [facility] US GUID NDL BIOPSY RIGHT    TONSILLECTOMY        Social History     Tobacco Use    Smoking status: Never    Smokeless tobacco: Never   Substance Use Topics    Alcohol use: No      Family History   Problem Relation Age of Onset    Breast Cancer Mother     Cancer Mother     Cancer Father         prostate    Breast Cancer Sister     Cancer Sister     Breast Cancer Sister     Breast Cancer Sister      Current Outpatient Medications on File Prior to Visit   Medication Sig Dispense Refill    omeprazole (PRILOSEC) 20 MG delayed release capsule Take 1 capsule by mouth every morning (before breakfast) 30 capsule 0    fluticasone (FLONASE) 50 MCG/ACT nasal spray 1 spray by Each Nostril route daily      azelastine (ASTELIN) 0.1 % nasal spray 1 spray by Nasal route 2 times daily Use in each nostril as directed 30 mL 3    azelastine (ASTELIN) 0.1 % nasal spray 1 spray by Nasal route 2 times daily Use in each nostril as directed 60 mL 1

## 2025-04-27 ENCOUNTER — APPOINTMENT (OUTPATIENT)
Dept: CT IMAGING | Age: 60
DRG: 812 | End: 2025-04-27
Payer: COMMERCIAL

## 2025-04-27 ENCOUNTER — HOSPITAL ENCOUNTER (INPATIENT)
Age: 60
LOS: 2 days | Discharge: HOME | DRG: 812 | End: 2025-04-29
Attending: EMERGENCY MEDICINE | Admitting: INTERNAL MEDICINE
Payer: COMMERCIAL

## 2025-04-27 DIAGNOSIS — D64.9 ANEMIA, UNSPECIFIED TYPE: ICD-10-CM

## 2025-04-27 DIAGNOSIS — R33.9 URINARY RETENTION: Primary | ICD-10-CM

## 2025-04-27 LAB
ABO/RH: NORMAL
ALBUMIN SERPL-MCNC: 4.2 G/DL (ref 3.4–5)
ALBUMIN/GLOB SERPL: 1.8 {RATIO} (ref 1.1–2.2)
ALP SERPL-CCNC: 40 U/L (ref 40–129)
ALT SERPL-CCNC: 6 U/L (ref 10–40)
ANION GAP SERPL CALCULATED.3IONS-SCNC: 9 MMOL/L (ref 3–16)
ANISOCYTOSIS BLD QL SMEAR: ABNORMAL
ANTIBODY SCREEN: NORMAL
AST SERPL-CCNC: 13 U/L (ref 15–37)
BACTERIA URNS QL MICRO: ABNORMAL /HPF
BASOPHILS # BLD: 0.1 K/UL (ref 0–0.2)
BASOPHILS NFR BLD: 1.6 %
BILIRUB SERPL-MCNC: 0.4 MG/DL (ref 0–1)
BILIRUB UR QL STRIP.AUTO: NEGATIVE
BLOOD BANK DISPENSE STATUS: NORMAL
BLOOD BANK DISPENSE STATUS: NORMAL
BLOOD BANK PRODUCT CODE: NORMAL
BLOOD BANK PRODUCT CODE: NORMAL
BPU ID: NORMAL
BPU ID: NORMAL
BUN SERPL-MCNC: 8 MG/DL (ref 7–20)
CALCIUM SERPL-MCNC: 8.9 MG/DL (ref 8.3–10.6)
CHLORIDE SERPL-SCNC: 105 MMOL/L (ref 99–110)
CLARITY UR: ABNORMAL
CO2 SERPL-SCNC: 23 MMOL/L (ref 21–32)
COLOR UR: ABNORMAL
CREAT SERPL-MCNC: 0.6 MG/DL (ref 0.6–1.2)
DACRYOCYTES BLD QL SMEAR: ABNORMAL
DEPRECATED RDW RBC AUTO: 21.4 % (ref 12.4–15.4)
DESCRIPTION BLOOD BANK: NORMAL
DESCRIPTION BLOOD BANK: NORMAL
EOSINOPHIL # BLD: 0.1 K/UL (ref 0–0.6)
EOSINOPHIL NFR BLD: 1.1 %
EPI CELLS #/AREA URNS HPF: ABNORMAL /HPF (ref 0–5)
GFR SERPLBLD CREATININE-BSD FMLA CKD-EPI: >90 ML/MIN/{1.73_M2}
GLUCOSE SERPL-MCNC: 102 MG/DL (ref 70–99)
GLUCOSE UR STRIP.AUTO-MCNC: NEGATIVE MG/DL
HCT VFR BLD AUTO: 19.6 % (ref 36–48)
HCT VFR BLD AUTO: 22.4 % (ref 36–48)
HEMOCCULT SP1 STL QL: NORMAL
HGB BLD-MCNC: 5.7 G/DL (ref 12–16)
HGB BLD-MCNC: 6.7 G/DL (ref 12–16)
HGB UR QL STRIP.AUTO: ABNORMAL
HYPOCHROMIA BLD QL SMEAR: ABNORMAL
IMM RETICS NFR: 0.41 % (ref 0.21–0.37)
INR PPP: 1.09 (ref 0.85–1.15)
KETONES UR STRIP.AUTO-MCNC: NEGATIVE MG/DL
LEUKOCYTE ESTERASE UR QL STRIP.AUTO: NEGATIVE
LYMPHOCYTES # BLD: 1.9 K/UL (ref 1–5.1)
LYMPHOCYTES NFR BLD: 32.2 %
MACROCYTES BLD QL SMEAR: ABNORMAL
MCH RBC QN AUTO: 15.3 PG (ref 26–34)
MCHC RBC AUTO-ENTMCNC: 28.9 G/DL (ref 31–36)
MCV RBC AUTO: 52.7 FL (ref 80–100)
MICROCYTES BLD QL SMEAR: ABNORMAL
MONOCYTES # BLD: 0.3 K/UL (ref 0–1.3)
MONOCYTES NFR BLD: 4.5 %
NEUTROPHILS # BLD: 3.6 K/UL (ref 1.7–7.7)
NEUTROPHILS NFR BLD: 60.6 %
NITRITE UR QL STRIP.AUTO: NEGATIVE
NT-PROBNP SERPL-MCNC: 162 PG/ML (ref 0–124)
OVALOCYTES BLD QL SMEAR: ABNORMAL
PATH INTERP BLD-IMP: YES
PH UR STRIP.AUTO: 6.5 [PH] (ref 5–8)
PLATELET # BLD AUTO: 446 K/UL (ref 135–450)
PLATELET BLD QL SMEAR: ADEQUATE
PMV BLD AUTO: 8.3 FL (ref 5–10.5)
POIKILOCYTOSIS BLD QL SMEAR: ABNORMAL
POTASSIUM SERPL-SCNC: 4.3 MMOL/L (ref 3.5–5.1)
PROT SERPL-MCNC: 6.6 G/DL (ref 6.4–8.2)
PROT UR STRIP.AUTO-MCNC: NEGATIVE MG/DL
PROTHROMBIN TIME: 14.3 SEC (ref 11.9–14.9)
RBC # BLD AUTO: 3.73 M/UL (ref 4–5.2)
RBC #/AREA URNS HPF: ABNORMAL /HPF (ref 0–4)
REASON FOR REJECTION: NORMAL
REJECTED TEST: NORMAL
RETICS # AUTO: 0.06 M/UL (ref 0.02–0.1)
RETICS/RBC NFR AUTO: 1.5 % (ref 0.5–2.18)
ROULEAUX BLD QL SMEAR: ABNORMAL
SLIDE REVIEW: ABNORMAL
SODIUM SERPL-SCNC: 137 MMOL/L (ref 136–145)
SP GR UR STRIP.AUTO: 1.01 (ref 1–1.03)
STOMATOCYTES BLD QL SMEAR: ABNORMAL
TARGETS BLD QL SMEAR: ABNORMAL
TROPONIN, HIGH SENSITIVITY: <6 NG/L (ref 0–14)
UA COMPLETE W REFLEX CULTURE PNL UR: ABNORMAL
UA DIPSTICK W REFLEX MICRO PNL UR: YES
URN SPEC COLLECT METH UR: ABNORMAL
UROBILINOGEN UR STRIP-ACNC: 0.2 E.U./DL
WBC # BLD AUTO: 6 K/UL (ref 4–11)
WBC #/AREA URNS HPF: ABNORMAL /HPF (ref 0–5)

## 2025-04-27 PROCEDURE — 86850 RBC ANTIBODY SCREEN: CPT

## 2025-04-27 PROCEDURE — 85018 HEMOGLOBIN: CPT

## 2025-04-27 PROCEDURE — 86900 BLOOD TYPING SEROLOGIC ABO: CPT

## 2025-04-27 PROCEDURE — 6360000004 HC RX CONTRAST MEDICATION: Performed by: PHYSICIAN ASSISTANT

## 2025-04-27 PROCEDURE — 30233N1 TRANSFUSION OF NONAUTOLOGOUS RED BLOOD CELLS INTO PERIPHERAL VEIN, PERCUTANEOUS APPROACH: ICD-10-PCS

## 2025-04-27 PROCEDURE — 82270 OCCULT BLOOD FECES: CPT

## 2025-04-27 PROCEDURE — 85014 HEMATOCRIT: CPT

## 2025-04-27 PROCEDURE — 85610 PROTHROMBIN TIME: CPT

## 2025-04-27 PROCEDURE — 2500000003 HC RX 250 WO HCPCS: Performed by: INTERNAL MEDICINE

## 2025-04-27 PROCEDURE — 74177 CT ABD & PELVIS W/CONTRAST: CPT

## 2025-04-27 PROCEDURE — 85025 COMPLETE CBC W/AUTO DIFF WBC: CPT

## 2025-04-27 PROCEDURE — 1200000000 HC SEMI PRIVATE

## 2025-04-27 PROCEDURE — 86901 BLOOD TYPING SEROLOGIC RH(D): CPT

## 2025-04-27 PROCEDURE — 36415 COLL VENOUS BLD VENIPUNCTURE: CPT

## 2025-04-27 PROCEDURE — 36430 TRANSFUSION BLD/BLD COMPNT: CPT

## 2025-04-27 PROCEDURE — 82746 ASSAY OF FOLIC ACID SERUM: CPT

## 2025-04-27 PROCEDURE — 83880 ASSAY OF NATRIURETIC PEPTIDE: CPT

## 2025-04-27 PROCEDURE — 99285 EMERGENCY DEPT VISIT HI MDM: CPT

## 2025-04-27 PROCEDURE — 51798 US URINE CAPACITY MEASURE: CPT

## 2025-04-27 PROCEDURE — 85045 AUTOMATED RETICULOCYTE COUNT: CPT

## 2025-04-27 PROCEDURE — 84484 ASSAY OF TROPONIN QUANT: CPT

## 2025-04-27 PROCEDURE — 6370000000 HC RX 637 (ALT 250 FOR IP): Performed by: INTERNAL MEDICINE

## 2025-04-27 PROCEDURE — 80053 COMPREHEN METABOLIC PANEL: CPT

## 2025-04-27 PROCEDURE — 83550 IRON BINDING TEST: CPT

## 2025-04-27 PROCEDURE — 86923 COMPATIBILITY TEST ELECTRIC: CPT

## 2025-04-27 PROCEDURE — 82607 VITAMIN B-12: CPT

## 2025-04-27 PROCEDURE — 83540 ASSAY OF IRON: CPT

## 2025-04-27 PROCEDURE — P9016 RBC LEUKOCYTES REDUCED: HCPCS

## 2025-04-27 PROCEDURE — 81001 URINALYSIS AUTO W/SCOPE: CPT

## 2025-04-27 RX ORDER — SODIUM CHLORIDE 9 MG/ML
INJECTION, SOLUTION INTRAVENOUS PRN
Status: DISCONTINUED | OUTPATIENT
Start: 2025-04-27 | End: 2025-04-29 | Stop reason: HOSPADM

## 2025-04-27 RX ORDER — IOPAMIDOL 755 MG/ML
75 INJECTION, SOLUTION INTRAVASCULAR
Status: COMPLETED | OUTPATIENT
Start: 2025-04-27 | End: 2025-04-27

## 2025-04-27 RX ORDER — POLYETHYLENE GLYCOL 3350 17 G/17G
17 POWDER, FOR SOLUTION ORAL DAILY PRN
Status: DISCONTINUED | OUTPATIENT
Start: 2025-04-27 | End: 2025-04-29 | Stop reason: HOSPADM

## 2025-04-27 RX ORDER — SODIUM CHLORIDE 0.9 % (FLUSH) 0.9 %
5-40 SYRINGE (ML) INJECTION EVERY 12 HOURS SCHEDULED
Status: DISCONTINUED | OUTPATIENT
Start: 2025-04-27 | End: 2025-04-29 | Stop reason: HOSPADM

## 2025-04-27 RX ORDER — ONDANSETRON 4 MG/1
4 TABLET, ORALLY DISINTEGRATING ORAL EVERY 8 HOURS PRN
Status: DISCONTINUED | OUTPATIENT
Start: 2025-04-27 | End: 2025-04-29 | Stop reason: HOSPADM

## 2025-04-27 RX ORDER — MAGNESIUM SULFATE IN WATER 40 MG/ML
2000 INJECTION, SOLUTION INTRAVENOUS PRN
Status: DISCONTINUED | OUTPATIENT
Start: 2025-04-27 | End: 2025-04-29 | Stop reason: HOSPADM

## 2025-04-27 RX ORDER — SODIUM CHLORIDE 0.9 % (FLUSH) 0.9 %
5-40 SYRINGE (ML) INJECTION PRN
Status: DISCONTINUED | OUTPATIENT
Start: 2025-04-27 | End: 2025-04-29 | Stop reason: HOSPADM

## 2025-04-27 RX ORDER — POTASSIUM CHLORIDE 7.45 MG/ML
10 INJECTION INTRAVENOUS PRN
Status: DISCONTINUED | OUTPATIENT
Start: 2025-04-27 | End: 2025-04-29 | Stop reason: HOSPADM

## 2025-04-27 RX ORDER — ONDANSETRON 2 MG/ML
4 INJECTION INTRAMUSCULAR; INTRAVENOUS EVERY 6 HOURS PRN
Status: DISCONTINUED | OUTPATIENT
Start: 2025-04-27 | End: 2025-04-29 | Stop reason: HOSPADM

## 2025-04-27 RX ORDER — OXYBUTYNIN CHLORIDE 5 MG/1
5 TABLET, EXTENDED RELEASE ORAL DAILY
Status: DISCONTINUED | OUTPATIENT
Start: 2025-04-27 | End: 2025-04-28

## 2025-04-27 RX ORDER — ACETAMINOPHEN 650 MG/1
650 SUPPOSITORY RECTAL EVERY 6 HOURS PRN
Status: DISCONTINUED | OUTPATIENT
Start: 2025-04-27 | End: 2025-04-29 | Stop reason: HOSPADM

## 2025-04-27 RX ORDER — VIBEGRON 75 MG/1
75 TABLET, FILM COATED ORAL DAILY
COMMUNITY

## 2025-04-27 RX ORDER — DICYCLOMINE HYDROCHLORIDE 10 MG/1
10 CAPSULE ORAL
Status: DISCONTINUED | OUTPATIENT
Start: 2025-04-28 | End: 2025-04-29 | Stop reason: HOSPADM

## 2025-04-27 RX ORDER — POTASSIUM CHLORIDE 1500 MG/1
40 TABLET, EXTENDED RELEASE ORAL PRN
Status: DISCONTINUED | OUTPATIENT
Start: 2025-04-27 | End: 2025-04-29 | Stop reason: HOSPADM

## 2025-04-27 RX ORDER — ACETAMINOPHEN 325 MG/1
650 TABLET ORAL EVERY 6 HOURS PRN
Status: DISCONTINUED | OUTPATIENT
Start: 2025-04-27 | End: 2025-04-29 | Stop reason: HOSPADM

## 2025-04-27 RX ADMIN — IOPAMIDOL 75 ML: 755 INJECTION, SOLUTION INTRAVENOUS at 16:17

## 2025-04-27 RX ADMIN — OXYBUTYNIN CHLORIDE 5 MG: 5 TABLET, EXTENDED RELEASE ORAL at 21:34

## 2025-04-27 RX ADMIN — SODIUM CHLORIDE, PRESERVATIVE FREE 10 ML: 5 INJECTION INTRAVENOUS at 21:34

## 2025-04-27 ASSESSMENT — LIFESTYLE VARIABLES
HOW MANY STANDARD DRINKS CONTAINING ALCOHOL DO YOU HAVE ON A TYPICAL DAY: PATIENT DOES NOT DRINK
HOW OFTEN DO YOU HAVE A DRINK CONTAINING ALCOHOL: NEVER

## 2025-04-27 ASSESSMENT — PAIN - FUNCTIONAL ASSESSMENT: PAIN_FUNCTIONAL_ASSESSMENT: 0-10

## 2025-04-27 NOTE — ED NOTES
Bhumi Covarrubias is a 59 y.o. female admitted for  Principal Problem:    Anemia  Resolved Problems:    * No resolved hospital problems. *  .   Patient Home via self with   Chief Complaint   Patient presents with    Urinary Retention     Patient reports she has been unable to urinate since 5am. States she feels the urge to go but can't.    .  Patient is alert and Person, Place, Time, and Situation  Patient's baseline mobility: independent PTA, KRISSY at this time d/t critical labs, receiving blood  Code Status: No Order   Cardiac Rhythm:       Is patient on baseline Oxygen: no how many Liters:   Abnormal Assessment Findings:     Isolation: None      NIH Score:    C-SSRS: Risk of Suicide: No Risk  Bedside swallow:        Active LDA's:   Peripheral IV 04/27/25 Proximal;Right;Anterior Forearm (Active)   Site Assessment Clean, dry & intact 04/27/25 1604   Line Status Blood return noted 04/27/25 1604   Phlebitis Assessment No symptoms 04/27/25 1604   Infiltration Assessment 0 04/27/25 1604     Patient admitted with a johnson: yes, placed in ED for acute urinary attention   Reason for johnson: Acute retention - insertion per MD order    Family/Caregiver Present no Any Concerns: no        Vitals: MEWS Score: 1    Vitals:    04/27/25 1531 04/27/25 1637 04/27/25 1652 04/27/25 1837   BP: 131/68 (!) 127/59 (!) 121/53 (!) 115/59   Pulse: 90 72 62    Resp: 16 16     Temp:  98.8 °F (37.1 °C) 97.8 °F (36.6 °C) 98.7 °F (37.1 °C)   TempSrc:  Oral Oral Oral   SpO2: 99% 100% 100%    Weight:       Height:           Last documented pain score (0-10 scale)    Pain medication administered No.    Pertinent or High Risk Medications/Drips:  received 1U PRBC in ED .    Pending Blood Product Administration: no    Abnormal labs:   Abnormal Labs Reviewed   COMPREHENSIVE METABOLIC PANEL W/ REFLEX TO MG FOR LOW K - Abnormal; Notable for the following components:       Result Value    Glucose 102 (*)     ALT 6 (*)     AST 13 (*)     All other components

## 2025-04-27 NOTE — ED PROVIDER NOTES
infection.            I was the primary provider for the patient along with ANITA Metz.    MDM: Patient was evaluated to concern for trouble with urination since this morning.  Incidental finding was that her hemoglobin was critically low and therefore blood transfusion was ordered.  She did give verbal consent for this.  Abdominal exam was benign with no tenderness.  No significant findings on CT of the abdomen or pelvis that would explain her low hemoglobin per radiologist.  She will need further evaluation in the hospital.                      Matthew Singh MD  04/28/25 2015    
on the sheet underneath her but otherwise her Hemoccult was negative.  With her new anemia and requiring blood transfusion I am recommending admission.  Hospitalist consulted at this time.    Disposition Considerations (tests considered but not done, Admit vs D/C, Shared Decision Making, Pt Expectation of Test or Tx.):   Results for orders placed or performed during the hospital encounter of 04/27/25   Comprehensive Metabolic Panel w/ Reflex to MG   Result Value Ref Range    Sodium 137 136 - 145 mmol/L    Potassium reflex Magnesium 4.3 3.5 - 5.1 mmol/L    Chloride 105 99 - 110 mmol/L    CO2 23 21 - 32 mmol/L    Anion Gap 9 3 - 16    Glucose 102 (H) 70 - 99 mg/dL    BUN 8 7 - 20 mg/dL    Creatinine 0.6 0.6 - 1.2 mg/dL    Est, Glom Filt Rate >90 >60    Calcium 8.9 8.3 - 10.6 mg/dL    Total Protein 6.6 6.4 - 8.2 g/dL    Albumin 4.2 3.4 - 5.0 g/dL    Albumin/Globulin Ratio 1.8 1.1 - 2.2    Total Bilirubin 0.4 0.0 - 1.0 mg/dL    Alkaline Phosphatase 40 40 - 129 U/L    ALT 6 (L) 10 - 40 U/L    AST 13 (L) 15 - 37 U/L   Urinalysis with Reflex to Culture    Specimen: Urine   Result Value Ref Range    Color, UA Straw Straw/Yellow    Clarity, UA SL CLOUDY (A) Clear    Glucose, Ur Negative Negative mg/dL    Bilirubin, Urine Negative Negative    Ketones, Urine Negative Negative mg/dL    Specific Gravity, UA 1.015 1.005 - 1.030    Blood, Urine TRACE (A) Negative    pH, Urine 6.5 5.0 - 8.0    Protein, UA Negative Negative mg/dL    Urobilinogen, Urine 0.2 <2.0 E.U./dL    Nitrite, Urine Negative Negative    Leukocyte Esterase, Urine Negative Negative    Microscopic Examination YES     Urine Type NotGiven     Urine Reflex to Culture Not Indicated    Microscopic Urinalysis   Result Value Ref Range    WBC, UA 0-2 0 - 5 /HPF    RBC, UA 3-4 0 - 4 /HPF    Epithelial Cells, UA 2-5 0 - 5 /HPF    Bacteria, UA Rare (A) None Seen /HPF   SPECIMEN REJECTION   Result Value Ref Range    Rejected Test cbcwd     Reason for Rejection see below    CBC

## 2025-04-27 NOTE — CONSENT
Informed Consent for Blood Component Transfusion Note    I have discussed with the patient the rationale for blood component transfusion; its benefits in treating or preventing fatigue, organ damage, or death; and its risk which includes mild transfusion reactions, rare risk of blood borne infection, or more serious but rare reactions. I have discussed the alternatives to transfusion, including the risk and consequences of not receiving transfusion. The patient had an opportunity to ask questions and had agreed to proceed with transfusion of blood components.    Electronically signed by ANITA Winters on 4/27/25 at 3:44 PM EDT

## 2025-04-27 NOTE — H&P
Hospital Medicine History & Physical    V 1.6    Date of Admission: 4/27/2025    Date of Service:  Pt seen/examined on 4/27/25     [x]Admitted to Inpatient with expected LOS greater than two midnights due to medical therapy.  []Placed in Observation status.    Chief Admission Complaint:  couldn't pee    Presenting Admission History:      59 y.o. female with hx of IBS, gerd, urinary incontinence who presented to [facility] with concerns for urinary retention. Pt was doing well until 5am today when she woke up and had very little urine outpt and described it as a 'tinkle'.  She starting drinking water and continued to feel full and noted lower abd pain/pressure. Even forcing herself could cause urination sso she drove in for evaluation.  No issues yesterday. She is med compliant with her oxybutyin.    -she denies any issues of late with hematuria/dysuria/polyuria  -notes prior episode in remote hx requiring catheter  -she denies obvious black stools, no bloody sools, no hematemesis  -She has never had a cscope but had neg cologuard a few years ago  -she did note some lightheadedness on fri(but ate something and felt better)  -she denies sob/fatigue/loc/dizziness/cp    Assessment/Plan:      Current Principal Problem:  Anemia    Anemia microcytic- unclear etiology  -monitored H/h  -1u prbc  -Gi consulted, (currently fobt neg in er)    Urinar retention- unclear etiology  -asked Urology input  -Continued home oxybutin    Gerd- stable  Not taking ppi currently    IBS- per emr  -mmgt as outpt, was taking bentyl      Discussed management and the need for Hospitalization of the patient w/ the Emergency Department Provider: Oriana HOWARD    CXR: I have reviewed the CXR with the following interpretation: na  EKG:  I have reviewed the EKG with the following interpretation: na    Physical Exam Performed:      BP (!) 115/59   Pulse 62   Temp 98.7 °F (37.1 °C) (Oral)   Resp 16   Ht 1.702 m (5' 7\")   Wt

## 2025-04-28 LAB
ANION GAP SERPL CALCULATED.3IONS-SCNC: 8 MMOL/L (ref 3–16)
BASOPHILS # BLD: 0.1 K/UL (ref 0–0.2)
BASOPHILS NFR BLD: 0.9 %
BUN SERPL-MCNC: 8 MG/DL (ref 7–20)
CALCIUM SERPL-MCNC: 8.5 MG/DL (ref 8.3–10.6)
CHLORIDE SERPL-SCNC: 105 MMOL/L (ref 99–110)
CO2 SERPL-SCNC: 24 MMOL/L (ref 21–32)
CREAT SERPL-MCNC: 0.7 MG/DL (ref 0.6–1.2)
DEPRECATED RDW RBC AUTO: 30.1 % (ref 12.4–15.4)
EOSINOPHIL # BLD: 0.1 K/UL (ref 0–0.6)
EOSINOPHIL NFR BLD: 1.7 %
FOLATE SERPL-MCNC: 5.98 NG/ML (ref 4.78–24.2)
GFR SERPLBLD CREATININE-BSD FMLA CKD-EPI: >90 ML/MIN/{1.73_M2}
GLUCOSE SERPL-MCNC: 92 MG/DL (ref 70–99)
HCT VFR BLD AUTO: 25.7 % (ref 36–48)
HCT VFR BLD AUTO: 25.9 % (ref 36–48)
HGB BLD-MCNC: 7.9 G/DL (ref 12–16)
HGB BLD-MCNC: 8 G/DL (ref 12–16)
IRON SATN MFR SERPL: 4 % (ref 15–50)
IRON SERPL-MCNC: 15 UG/DL (ref 37–145)
LYMPHOCYTES # BLD: 2.3 K/UL (ref 1–5.1)
LYMPHOCYTES NFR BLD: 27.4 %
MCH RBC QN AUTO: 18 PG (ref 26–34)
MCHC RBC AUTO-ENTMCNC: 30.7 G/DL (ref 31–36)
MCV RBC AUTO: 58.4 FL (ref 80–100)
MONOCYTES # BLD: 0.5 K/UL (ref 0–1.3)
MONOCYTES NFR BLD: 6.2 %
NEUTROPHILS # BLD: 5.4 K/UL (ref 1.7–7.7)
NEUTROPHILS NFR BLD: 63.8 %
PATH INTERP BLD-IMP: NO
PATH INTERP BLD-IMP: NORMAL
PLATELET # BLD AUTO: 444 K/UL (ref 135–450)
PMV BLD AUTO: 8.2 FL (ref 5–10.5)
POTASSIUM SERPL-SCNC: 4.1 MMOL/L (ref 3.5–5.1)
RBC # BLD AUTO: 4.4 M/UL (ref 4–5.2)
SODIUM SERPL-SCNC: 137 MMOL/L (ref 136–145)
TIBC SERPL-MCNC: 397 UG/DL (ref 260–445)
TSH SERPL DL<=0.005 MIU/L-ACNC: 2.12 UIU/ML (ref 0.27–4.2)
VIT B12 SERPL-MCNC: 273 PG/ML (ref 211–911)
WBC # BLD AUTO: 8.4 K/UL (ref 4–11)

## 2025-04-28 PROCEDURE — 2580000003 HC RX 258: Performed by: NURSE PRACTITIONER

## 2025-04-28 PROCEDURE — 6370000000 HC RX 637 (ALT 250 FOR IP): Performed by: INTERNAL MEDICINE

## 2025-04-28 PROCEDURE — 85025 COMPLETE CBC W/AUTO DIFF WBC: CPT

## 2025-04-28 PROCEDURE — 1200000000 HC SEMI PRIVATE

## 2025-04-28 PROCEDURE — 85018 HEMOGLOBIN: CPT

## 2025-04-28 PROCEDURE — 85014 HEMATOCRIT: CPT

## 2025-04-28 PROCEDURE — 6360000002 HC RX W HCPCS: Performed by: NURSE PRACTITIONER

## 2025-04-28 PROCEDURE — 36415 COLL VENOUS BLD VENIPUNCTURE: CPT

## 2025-04-28 PROCEDURE — 84443 ASSAY THYROID STIM HORMONE: CPT

## 2025-04-28 PROCEDURE — 80048 BASIC METABOLIC PNL TOTAL CA: CPT

## 2025-04-28 RX ADMIN — IRON SUCROSE 100 MG: 20 INJECTION, SOLUTION INTRAVENOUS at 16:05

## 2025-04-28 RX ADMIN — OXYBUTYNIN CHLORIDE 5 MG: 5 TABLET, EXTENDED RELEASE ORAL at 07:53

## 2025-04-28 NOTE — CONSULTS
Urology Consult Note      Reason for Consultation: retention    Chief Complaint: \"I have had trouble with voiding in the past, but the recent problem started suddenly yesterday\"    HPI:  Bhumi is a 59 y.o. female with a history of overactive bladder and urinary urge incontinence.  She has been on oxybutynin for 5 or 6 years.  She reports acute urinary retention that started yesterday morning at 5 AM.  She had the normal urge to void, but went to the bathroom and only a \"trickle\" would come out.  She denies significant constipation.  She denies any change in her medications.  She has had issues with retention in the past and is required a catheter, but this was in a few years.  She does not see urology regularly.  She had a cystoscopy in 2021 which showed a urethral stricture which was dilated.    Of note, she came to the hospital with complaint of urinary retention, but was subsequently found to have significant anemia with a hemoglobin of 5.7 and was admitted for further evaluation of this.    Past Medical History:   Diagnosis Date    Acute UTI     Allergic rhinitis     Asthma     Cervical spine pain 04/15/2019    GERD (gastroesophageal reflux disease) 7-2023    Diarrhea and constipation    Irritable bowel syndrome 4/9/2025    Left cervical radiculopathy     Protrusion of cervical intervertebral disc 07/01/2019    Urinary incontinence Not sure    On oxybutin       Past Surgical History:   Procedure Laterality Date    ECTOPIC PREGNANCY SURGERY      [facility] GUID NDL BIOPSY RIGHT Right 2/24/2025    [facility] GUID NDL BIOPSY RIGHT    TONSILLECTOMY         Medication List reviewed:  Current Facility-Administered Medications   Medication Dose Route Frequency Provider Last Rate Last Admin    0.9 % sodium chloride infusion   IntraVENous PRN Saud Harris MD        dicyclomine (BENTYL) capsule 10 mg  10 mg Oral BID AC Saud Harris MD        [Held by provider] oxyBUTYnin (DITROPAN-XL) extended release tablet 5

## 2025-04-28 NOTE — PLAN OF CARE
Problem: Pain  Goal: Verbalizes/displays adequate comfort level or baseline comfort level  4/28/2025 0746 by Mario Mancia RN  Outcome: Progressing  4/27/2025 2129 by Mikal Allen RN  Outcome: Progressing  Flowsheets (Taken 4/27/2025 2129)  Verbalizes/displays adequate comfort level or baseline comfort level:   Encourage patient to monitor pain and request assistance   Assess pain using appropriate pain scale   Administer analgesics based on type and severity of pain and evaluate response   Implement non-pharmacological measures as appropriate and evaluate response     Problem: ABCDS Injury Assessment  Goal: Absence of physical injury  4/28/2025 0746 by Mario Mancia, RN  Outcome: Progressing  4/27/2025 2129 by Mikal Allen RN  Outcome: Progressing  Flowsheets (Taken 4/27/2025 2129)  Absence of Physical Injury: Implement safety measures based on patient assessment

## 2025-04-28 NOTE — CONSULTS
Consult Placed   Consult sent via perfect serve  Who: Ally huynh  Date: 4/28/2025  Time: 8:28 AM       Electronically signed by Blanca Morton on 4/28/2025 at 8:27 AM

## 2025-04-28 NOTE — CONSULTS
CONSULTATION  Bhumi Covarrubias is a 59 y.o. female asked to see us in consultation by  Bing Flores APRN - CNP & Madhuri Sampson DO for evaluation of microcytic anemia.      Ms. Covarrubias is a 59-year-old female with past medical history of chronic constipation, GERD, IBS, overactive bladder and urinary incontinence who presents to the ER with urinary retention.  She has been evaluated by urology and had a Dunaway placed.  She was incidentally found to have profound microcytic anemia with a Hgb of 5.7 with last baseline being 11.2 in 2022. GI was consulted to evaluate.  She denies melena, rectal bleeding, hematemesis, epistaxis and hematuria.  No blood thinners or NSAID use.  She does report chronic constipation although at present has been doing well without a bowel regimen at home, reporting one BM daily.   She denies a family history of colon cancer.  She was last seen by us in office in 2023 for constipation and hematochezia.  A colonoscopy was recommended but she never scheduled.  She had a negative Cologuard test in 2024.  No prior colonoscopy.    This admission she has received 2 units of blood with correction in Hgb to 7.9. Iron studies demonstrate deficiency.  Stool was heme negative in the ER.        Medications Prior to Admission: vibegron (GEMTESA) 75 MG TABS tablet, Take 1 tablet by mouth daily  oxyBUTYnin (DITROPAN-XL) 5 MG extended release tablet, Take 1 tablet by mouth daily  dicyclomine (BENTYL) 10 MG capsule, Take 1 capsule by mouth 4 times daily (Patient not taking: Reported on 4/27/2025)  omeprazole (PRILOSEC) 20 MG delayed release capsule, Take 1 capsule by mouth every morning (before breakfast) (Patient not taking: Reported on 4/27/2025)    Medication:   dicyclomine  10 mg Oral BID AC    [Held by provider] oxyBUTYnin  5 mg Oral Daily    sodium chloride flush  5-40 mL IntraVENous 2 times per day

## 2025-04-28 NOTE — CARE COORDINATION
Case Management Assessment  Initial Evaluation    Date/Time of Evaluation: 4/28/2025 10:10 AM  Assessment Completed by: Travis Jaramillo RN    If patient is discharged prior to next notation, then this note serves as note for discharge by case management.    Patient Name: Bhumi Covarrubias                   YOB: 1965  Diagnosis: Urinary retention [R33.9]  Anemia [D64.9]  Anemia, unspecified type [D64.9]                   Date / Time: 4/27/2025 12:38 PM    Patient Admission Status: Inpatient   Readmission Risk (Low < 19, Mod (19-27), High > 27): Readmission Risk Score: 10.2    Current PCP: Bing Flores APRN - CNP  PCP verified by CM? Yes    Chart Reviewed: Yes      History Provided by: Patient  Patient Orientation: Alert and Oriented, Person, Place, Situation, Self    Patient Cognition: Alert    Hospitalization in the last 30 days (Readmission):  No    If yes, Readmission Assessment in CM Navigator will be completed.    Advance Directives:      Code Status: Full Code   Patient's Primary Decision Maker is: Legal Next of Kin    Primary Decision Maker: SHAVONNICOLAS GAMBLE - Child - [phone]    Discharge Planning:    Patient lives with: Children Type of Home: House  Primary Care Giver: Self  Patient Support Systems include: Children   Current Financial resources: Medicaid  Current community resources: None  Current services prior to admission: None            Current DME:              Type of Home Care services:  None    ADLS  Prior functional level: Independent in ADLs/IADLs  Current functional level: Independent in ADLs/IADLs    PT AM-PAC:   /24  OT AM-PAC:   /24    Family can provide assistance at DC: Yes  Would you like Case Management to discuss the discharge plan with any other family members/significant others, and if so, who? No  Plans to Return to Present Housing: Yes  Other Identified Issues/Barriers to RETURNING to current housing: none  Potential Assistance needed at discharge: N/A

## 2025-04-28 NOTE — CONSULTS
Consult Placed   Consult Called, left message with answering service  Who: [facility]  Date:4/27/2025  Time:8:17 PM

## 2025-04-28 NOTE — PLAN OF CARE
Problem: Pain  Goal: Verbalizes/displays adequate comfort level or baseline comfort level  Outcome: Progressing  Flowsheets (Taken 4/27/2025 2129)  Verbalizes/displays adequate comfort level or baseline comfort level:   Encourage patient to monitor pain and request assistance   Assess pain using appropriate pain scale   Administer analgesics based on type and severity of pain and evaluate response   Implement non-pharmacological measures as appropriate and evaluate response     Problem: ABCDS Injury Assessment  Goal: Absence of physical injury  Outcome: Progressing  Flowsheets (Taken 4/27/2025 2129)  Absence of Physical Injury: Implement safety measures based on patient assessment     Problem: Safety - Adult  Goal: Free from fall injury  Outcome: Progressing  Flowsheets (Taken 4/27/2025 2129)  Free From Fall Injury:   Instruct family/caregiver on patient safety   Based on caregiver fall risk screen, instruct family/caregiver to ask for assistance with transferring infant if caregiver noted to have fall risk factors     Problem: Skin/Tissue Integrity - Adult  Goal: Skin integrity remains intact  Outcome: Progressing  Flowsheets (Taken 4/27/2025 2129)  Skin Integrity Remains Intact:   Monitor for areas of redness and/or skin breakdown   Assess vascular access sites hourly   Every 4-6 hours minimum:  Change oxygen saturation probe site   Every 4-6 hours:  If on nasal continuous positive airway pressure, assess nares and determine need for appliance change or resting period   Turn and reposition as indicated   Assess need for specialty bed   Pressure redistribution bed/mattress (bed type)   Positioning devices   Check visual cues for pain     Problem: Genitourinary - Adult  Goal: Urinary catheter remains patent  Outcome: Progressing  Flowsheets (Taken 4/27/2025 2129)  Urinary catheter remains patent:   Assess patency of urinary catheter   Irrigate catheter per Licensed Independent Practitioner order if indicated and

## 2025-04-29 VITALS
RESPIRATION RATE: 16 BRPM | WEIGHT: 159.1 LBS | TEMPERATURE: 99 F | SYSTOLIC BLOOD PRESSURE: 107 MMHG | OXYGEN SATURATION: 96 % | HEART RATE: 95 BPM | DIASTOLIC BLOOD PRESSURE: 65 MMHG | BODY MASS INDEX: 25.57 KG/M2 | HEIGHT: 66 IN

## 2025-04-29 LAB
ANION GAP SERPL CALCULATED.3IONS-SCNC: 10 MMOL/L (ref 3–16)
BUN SERPL-MCNC: 10 MG/DL (ref 7–20)
CALCIUM SERPL-MCNC: 8.9 MG/DL (ref 8.3–10.6)
CHLORIDE SERPL-SCNC: 103 MMOL/L (ref 99–110)
CO2 SERPL-SCNC: 23 MMOL/L (ref 21–32)
CREAT SERPL-MCNC: 0.7 MG/DL (ref 0.6–1.2)
DEPRECATED RDW RBC AUTO: 30 % (ref 12.4–15.4)
GFR SERPLBLD CREATININE-BSD FMLA CKD-EPI: >90 ML/MIN/{1.73_M2}
GLUCOSE SERPL-MCNC: 96 MG/DL (ref 70–99)
HCT VFR BLD AUTO: 27 % (ref 36–48)
HGB BLD-MCNC: 8.4 G/DL (ref 12–16)
MCH RBC QN AUTO: 18.4 PG (ref 26–34)
MCHC RBC AUTO-ENTMCNC: 31.2 G/DL (ref 31–36)
MCV RBC AUTO: 58.9 FL (ref 80–100)
PATH INTERP BLD-IMP: NO
PLATELET # BLD AUTO: 408 K/UL (ref 135–450)
PMV BLD AUTO: 8.3 FL (ref 5–10.5)
POTASSIUM SERPL-SCNC: 4.1 MMOL/L (ref 3.5–5.1)
RBC # BLD AUTO: 4.59 M/UL (ref 4–5.2)
SODIUM SERPL-SCNC: 136 MMOL/L (ref 136–145)
WBC # BLD AUTO: 8 K/UL (ref 4–11)

## 2025-04-29 PROCEDURE — 80048 BASIC METABOLIC PNL TOTAL CA: CPT

## 2025-04-29 PROCEDURE — 2580000003 HC RX 258: Performed by: NURSE PRACTITIONER

## 2025-04-29 PROCEDURE — 51702 INSERT TEMP BLADDER CATH: CPT

## 2025-04-29 PROCEDURE — 85027 COMPLETE CBC AUTOMATED: CPT

## 2025-04-29 PROCEDURE — 36415 COLL VENOUS BLD VENIPUNCTURE: CPT

## 2025-04-29 PROCEDURE — 2500000003 HC RX 250 WO HCPCS: Performed by: INTERNAL MEDICINE

## 2025-04-29 PROCEDURE — 6360000002 HC RX W HCPCS: Performed by: NURSE PRACTITIONER

## 2025-04-29 RX ADMIN — SODIUM CHLORIDE, PRESERVATIVE FREE 5 ML: 5 INJECTION INTRAVENOUS at 08:36

## 2025-04-29 RX ADMIN — IRON SUCROSE 100 MG: 20 INJECTION, SOLUTION INTRAVENOUS at 13:12

## 2025-04-29 NOTE — PROGRESS NOTES
PROGRESS NOTE    HPI: Bhumi Covarrubias is a(n)59 y.o. female admitted for work-up and treatment for Urinary retention [R33.9]  Anemia [D64.9]  Anemia, unspecified type [D64.9].     We are following for microcytic anemia.    Subjective:     No GI complaints.      Objective:     I/O last 3 completed shifts:  In: 1552.3 [P.O.:870; I.V.:10; Blood:672.3]  Out: 2960 [Urine:2960]      /65   Pulse 95   Temp 99 °F (37.2 °C) (Oral)   Resp 16   Ht 1.676 m (5' 6\")   Wt 72.2 kg (159 lb 1.6 oz)   LMP 10/02/2020   SpO2 96%   BMI 25.68 kg/m²     Physical Exam:  HEENT: anicteric sclera, oropharyngeal membranes pink and moist.  Cor: RRR  Lungs: non-labored, no respiratory distress  Abdomen: soft, NT. No ascites.  No hepatomegaly or splenomegaly  Extremities: no edema  Neuro: alert and oriented x 3      Results:  Lab Results   Component Value Date    ALT 6 (L) 04/27/2025    AST 13 (L) 04/27/2025    ALKPHOS 40 04/27/2025    INR 1.09 04/27/2025     Lab Results   Component Value Date    WBC 8.0 04/29/2025    HGB 8.4 (L) 04/29/2025    HCT 27.0 (L) 04/29/2025    MCV 58.9 (L) 04/29/2025     04/29/2025     BUN/Cr/glu/ALT/AST/amyl/lip:  10/0.7/--/--/--/--/-- (04/29 0501)  CT ABDOMEN PELVIS W IV CONTRAST Additional Contrast? None  Result Date: 4/27/2025  No sequelae of urinary retention. The urinary bladder is decompressed by Dunaway catheter. No hydronephrosis. Simple appearing hepatic and left renal cysts which do not require dedicated imaging follow-up. Electronically signed by Robbie Pryor MD        Impression:  59-year-old female with past medical history of chronic constipation, GERD, IBS, overactive bladder and urinary incontinence who presents to the ER with urinary retention.  Incidentally found to be anemic.     Profound BRENT.  No overt signs GIB.  Stool heme negative.  No baseline CBC for review in the past 3 years.  No prior scopes. She had negative 
     Urology Progress Note      Subjective: Bhumi Covarrubias feels stronger after blood transfusion, wants johnson removed     Vitals:  /73   Pulse 83   Temp 97.5 °F (36.4 °C) (Oral)   Resp 16   Ht 1.676 m (5' 6\")   Wt 72.2 kg (159 lb 1.6 oz)   LMP 10/02/2020   SpO2 100%   BMI 25.68 kg/m²   Temp  Av °F (36.7 °C)  Min: 97.5 °F (36.4 °C)  Max: 98.4 °F (36.9 °C)    Intake/Output Summary (Last 24 hours) at 2025 1014  Last data filed at 2025 0922  Gross per 24 hour   Intake 880 ml   Output 1700 ml   Net -820 ml       Exam:   Physical:  Well developed, well nourished in no acute distress  Mood indicates no abnormalities. Pt doesn’t appear depressed  Orientated to time and place      Female :   Johnson draining clear yellow urine     Labs:  WBC:    Lab Results   Component Value Date/Time    WBC 8.0 2025 05:01 AM     Hemoglobin/Hematocrit:    Lab Results   Component Value Date/Time    HGB 8.4 2025 05:01 AM    HCT 27.0 2025 05:01 AM     BMP:    Lab Results   Component Value Date/Time     2025 05:01 AM    K 4.1 2025 05:01 AM     2025 05:01 AM    CO2 23 2025 05:01 AM    BUN 10 2025 05:01 AM    CREATININE 0.7 2025 05:01 AM    CALCIUM 8.9 2025 05:01 AM    GFRAA >60 2020 01:13 PM    GFRAA >60 2013 11:00 AM    LABGLOM >90 2025 05:01 AM    LABGLOM >60 2023 08:37 AM     PT/INR:    Lab Results   Component Value Date/Time    PROTIME 14.3 2025 12:56 PM    INR 1.09 2025 12:56 PM     Urinalysis: Negative        Imaging:   CT ABDOMEN PELVIS W IV CONTRAST Additional Contrast? None   Final Result       No sequelae of urinary retention. The urinary bladder is decompressed by Johnson catheter. No hydronephrosis.       Simple appearing hepatic and left renal cysts which do not require dedicated imaging follow-up.       Electronically signed by Robbie Pryor MD                Impression/Plan:      #Acute 
  Hospital Medicine Progress Note  V 1.6      Date of Admission: 4/27/2025    Hospital Day: 2      Chief Admission Complaint: unable to urinate     Subjective:  EMR and notes reviewed pt seen and examined. NAD. PT has no complaints at this time. No chest pain or sob    Presenting Admission History:       59 y.o. female with hx of IBS, gerd, urinary incontinence who presented to [facility] with concerns for urinary retention. Pt was doing well until 5am today when she woke up and had very little urine outpt and described it as a 'tinkle'.  She starting drinking water and continued to feel full and noted lower abd pain/pressure. Even forcing herself could cause urination sso she drove in for evaluation.  No issues yesterday. She is med compliant with her oxybutyin.    -she denies any issues of late with hematuria/dysuria/polyuria  -notes prior episode in remote hx requiring catheter  -she denies obvious black stools, no bloody sools, no hematemesis  -She has never had a cscope but had neg cologuard a few years ago  -she did note some lightheadedness on fri(but ate something and felt better)  -she denies sob/fatigue/loc/dizziness/cp     Assessment/Plan:       Current Principal Problem:  Anemia     Anemia microcytic- unclear etiology, no hx of gi bleed, no blood thinning medications. No Ca hx. Endorses recent few month weigh loss unintentional. No Vag bleeding    -monitored H/h- POA with H/H of 5.7/19 received 1u prbc, stable 7.9/25.7   -Gi consulted, (currently fobt neg in er)  - CT of abd non acute and none contributing   - add iron studies consistent with Fe Def ,B12 folate WNL  check tsh. start venfor   - serial labs      Urinary retention- unclear etiology  -asked Urology input  -Continued home oxybutin- stopped per URology as these meds can cause urinary rentention   - 4/28 appears johnson cath was placed and has adequate UO. Reviewed CT of abd.   - Urology consult note reviewed with recs to cathy 
4 Eyes Skin Assessment and Patient belongings     The patient is being assess for  Admission    I agree that 2 Nurses have performed a thorough Head to Toe Skin Assessment on the patient. ALL assessment sites listed below have been assessed.       Areas assessed by both nurses: Mikal RN/Zackery RN  [x]   Head, Face, and Ears   [x]   Shoulders, Back, and Chest  [x]   Arms, Elbows, and Hands   [x]   Coccyx, Sacrum, and IschIum  [x]   Legs, Feet, and Heels        Does the Patient have Skin Breakdown?  No   Redness (buttocks) - blanchable        Vadim Prevention initiated:  No   Wound Care Orders initiated:  No      [facility] nurse consulted for Pressure Injury (Stage 3,4, Unstageable, DTI, NWPT, and Complex wounds), New and Established Ostomies:  No      I agree that 2 Nurses have reviewed patient belongings with the patient/family and documented in the flowsheet upon admission or transfer to the unit.     Belongings  Dental Appliances: None  Vision - Corrective Lenses: Eyeglasses, At bedside  Hearing Aid: None  Clothing: At bedside, Socks, Footwear, Shirt, Pants  Jewelry: None  Body Piercings Removed: No  Electronic Devices: Camera, At bedside  Weapons (Notify Protective Services/Security): None  Other Valuables: Purse, Wallet, Keys, Credit/Debit Card, Money, At bedside  Home Medications: Kept at bedside  Valuables Given To: Other (Comment) (x1 Gemtesa pill)  Provide Name(s) of Who Valuable(s) Were Given To: Ms Tripathi       Nurse 1 eSignature: Electronically signed by Mikal Allen RN on 4/27/25 at 9:28 PM EDT    **SHARE this note so that the co-signing nurse is able to place an eSignature**    Nurse 2 eSignature: Electronically signed by Zackery Prince RN on 4/28/25 at 6:39 AM EDT   
Assessment completed and documented. VSS. A/ox4. Denies pain. johnson catheter leaking on james pad for the first time since last night when Mikal RN put 2ml more in balloon. This RN assessed balloon, 12ml in balloon at this time, waiting for urology to round prior to exchange. Ambulates SBA due to johnson catheter. States she had some very small amount of bright red blood during bowel movement this morning, but she says she has hx of hemorrhoids. NPO at this time, sips with med. Bed locked and in lowest position. Bedside table and call light within reach. Denies further needs at this time.    Venkat HOWARD aware of leaky catheter. Okay to keep catheter and NOT exchange for bigger size. Catheter to potentially be removed in 2 days per Venkat HOWARD during rounding.  
Discharge education provided both verbally and written, patient verbalized understanding, denies questions. Tele monitor removed, CMU notified. PIV removed without complications.  Patient left with all belongings including phone, and .   Patient able to void over 650ml since johnson removal this morning without PVR. Tolerating meals. Instructed to f/u with her urologist and set up appointment for colonoscopy and EGD per GI recommendation.   VSS. Patient left via wheelchair. Able to drive self home.  
Dunaway cath removed per order, tolerated well w/o complications. Voiding trial in place, pt educated, verbalized understanding. Hat placed in toilet. Care ongoing.   
PS sent to JENNA Nicole - \" Hi Tamia. Patient repeat Hgb test after receving a unit of blood in ED Hgb 6.7. Patient was seen by Dr Harris and i tilod him about the latst Hgb result. He did put some tranfusion orders but maybe forget to put an order for the Blood product itself. Called Blood bank and they said there is no order for a blood yet. Thank you.\"    2047H - Blood transfusion set orders received from JENNA Nicole.     2118H - Blood bank called and informing this writer that they haven't received any                 order to prepare blood products for the patient.                - PS sent again to JENNA Nicole regarding blood bank request and                  reordering for preparation of blood products. New orders received and                  informed blood bank.     2134H - Blood bank called and informing this writer that 1 unit of PRBC already                 available for transfusion. Informed patient and VS taken recorded.     2147H - Started BT of 1 unit PRBC with Unit # [id number]; 300 ml volume.                 Rate started at 60 cc/hr. Verified and double checked with Zackery OBREGON.                  Educated patient on BT reaction and verbalized understanding. Will keep                  monitor for the next 15 minutes.     2205H - Patient has no complains of any discomfort from ongoing transfusion.                  Increased BT rate to 120 cc/hr. Encouraged patient to call this writer if                  she feels any discomfort or reaction with ongoing BT.     04/28/2025  0030H - BT of PRBC 300 ml completed with no transfusion reaction noted. Post                   transfusion VS taken and recorded.   
Patient admitted from ED to C3 Room 334. Wheeled via wheelchair. Pt alert and oriented x4.  On RA and denies any SOB. Patient oriented to room, call light, bed rails, phone, lights and bathroom.  Patient instructed about prescribed diet and be on NPO after MN. See flowsheet for vitals and assessment.  See MAR for meds.    Patient might needed another BT per latest Hgb result. Skin assessment done and patient personal belongings checked with Zackery OBREGON. Bed locked, in lowest position, side rails up, call light within reach.     
  []Seen with Recommendations for:   []Home independently  []Home w/ assist  []HHC  []SNF  []Acute Rehab    Multi-Disciplinary Rounds with Case Management completed on 4/29/2025 with the following recommendations:  Anticipated Discharge Location: [x]Home w/ []HHC vs []SNF  []Acute Rehab  []LTAC  []Hospice  []Other -    Anticipated Discharge Day/Date:  4/29-30  Barriers to Discharge: anemia urinary rentention   --------------------------------------------------    MDM (any 2 required for High level billing)    A. Problems (any 1)  [x] Acute/Chronic Illness/injury posing ongoing threat to life and/or bodily function without ongoing treatment    [] Severe exacerbation of chronic illness    --------------------------------------------------  B. Risk of Treatment (any 1)    [] Drugs/treatments that require intensive monitoring for toxicity    [] IV ABX (Vancomycin, Aminoglycosides, etc)     [] Post-Cath/Contrast study requiring serial monitoring    [] IV Narcotic analgesia    [] Aggressive IV diuresis    [] Hypertonic Saline    [] Critical electrolyte abnormalities requiring IV replacement    [] Insulin - Scheduled/SSI or Insulin gtt    [] Anticoagulation (Heparin gtt or Coumadin - other anticoagulants in special circumstances)    [] Cardiac Medications (IV Amiodarone/Diltiazem, Tikosyn, etc)    [] Hemodialysis    [x] Other -    [] Change in code status    [] Decision to escalate care    [] Major surgery/procedure with associated risk factors    --------------------------------------------------  C. Data (any 2)    [] Data Review (any 3)    [x] Consultant notes from yesterday/today    [x] All available current labs reviewed interpreted for clinical significance    [x] Appropriate follow-up labs were ordered  [] Collateral history obtained     [] Independent Interpretation of tests (any 1)    [] Telemetry (Rhythm Strip) personally reviewed and interpreted        [] Imaging personally reviewed and interpreted     [x]

## 2025-04-29 NOTE — DISCHARGE SUMMARY
not require dedicated imaging follow-up. Electronically signed by Robbie Pryor MD      Consults:     IP CONSULT TO HOSPITALIST  IP CONSULT TO UROLOGY  IP CONSULT TO GI    Labs:     Recent Labs     04/27/25  1414 04/27/25  1921 04/28/25  0133 04/28/25  0526 04/29/25  0501   WBC 6.0  --   --  8.4 8.0   HGB 5.7*   < > 8.0* 7.9* 8.4*   HCT 19.6*   < > 25.9* 25.7* 27.0*     --   --  444 408    < > = values in this interval not displayed.     Recent Labs     04/27/25  1256 04/28/25  0526 04/29/25  0501    137 136   K 4.3 4.1 4.1    105 103   CO2 23 24 23   BUN 8 8 10   CREATININE 0.6 0.7 0.7   CALCIUM 8.9 8.5 8.9     Recent Labs     04/27/25  1256   PROBNP 162*   TROPHS <6     No results for input(s): \"LABA1C\" in the last 72 hours.  Recent Labs     04/27/25  1256   AST 13*   ALT 6*   BILITOT 0.4   ALKPHOS 40     Recent Labs     04/27/25  1256 04/28/25  0526   INR 1.09  --    TSH  --  2.12       Urine Cultures: No results found for: \"LABURIN\"  Blood Cultures: No results found for: \"BC\"  No results found for: \"BLOODCULT2\"  Organism: No results found for: \"ORG\"    Signed:    Monica Landin, APRN - CNP

## 2025-04-29 NOTE — PLAN OF CARE
Problem: Pain  Goal: Verbalizes/displays adequate comfort level or baseline comfort level  4/28/2025 2340 by Zenobia Gray RN  Outcome: Progressing  4/28/2025 2340 by Zenobia Gray RN  Outcome: Progressing     Problem: ABCDS Injury Assessment  Goal: Absence of physical injury  4/28/2025 2340 by Zenobia Gray RN  Outcome: Progressing  4/28/2025 2340 by Zenobia Gray RN  Outcome: Progressing     Problem: Safety - Adult  Goal: Free from fall injury  4/28/2025 2340 by Zenobia Gray RN  Outcome: Progressing  4/28/2025 2340 by Zenobia Gray RN  Outcome: Progressing     Problem: Skin/Tissue Integrity - Adult  Goal: Skin integrity remains intact  4/28/2025 2340 by Zenobia Gray RN  Outcome: Progressing  4/28/2025 2340 by Zenobia Gray RN  Outcome: Progressing     Problem: Genitourinary - Adult  Goal: Absence of urinary retention  4/28/2025 2340 by Zenobia Gray RN  Outcome: Progressing  4/28/2025 2340 by Zenobia Gray RN  Outcome: Progressing  Goal: Urinary catheter remains patent  4/28/2025 2340 by Zenobia Gray RN  Outcome: Progressing  4/28/2025 2340 by Zenobia Gray RN  Outcome: Progressing     Problem: Hematologic - Adult  Goal: Maintains hematologic stability  4/28/2025 2340 by Zenobia Gray RN  Outcome: Progressing  4/28/2025 2340 by Zenobia Gray RN  Outcome: Progressing     Problem: Discharge Planning  Goal: Discharge to home or other facility with appropriate resources  4/28/2025 2340 by Zenobia Gray RN  Outcome: Progressing  4/28/2025 2340 by Zenobia Gray RN  Outcome: Progressing

## 2025-04-29 NOTE — CARE COORDINATION
CASE MANAGEMENT DISCHARGE SUMMARY      Discharge to: home, pending voiding trial.         Transportation: PVT     Confirmed discharge plan with:     Patient: yes     Family:  yes per patient          RN, name: Mario Jaramillo RN

## 2025-04-29 NOTE — DISCHARGE INSTRUCTIONS
Per urology: [place] [facility] [facility] for further eval with urodynamics testing +/- cystoscopy

## 2025-04-29 NOTE — PLAN OF CARE
Problem: Pain  Goal: Verbalizes/displays adequate comfort level or baseline comfort level  4/29/2025 0850 by Bing Chang LPN  Outcome: Progressing  Flowsheets (Taken 4/27/2025 2129 by Mikal Allen, RN)  Verbalizes/displays adequate comfort level or baseline comfort level:   Encourage patient to monitor pain and request assistance   Assess pain using appropriate pain scale   Administer analgesics based on type and severity of pain and evaluate response   Implement non-pharmacological measures as appropriate and evaluate response     Problem: ABCDS Injury Assessment  Goal: Absence of physical injury  4/29/2025 0850 by Bing Chang LPN  Outcome: Progressing  Flowsheets (Taken 4/27/2025 2129 by Mikal Allen, RN)  Absence of Physical Injury: Implement safety measures based on patient assessment     Problem: Safety - Adult  Goal: Free from fall injury  4/29/2025 0850 by Bing Chang LPN  Outcome: Progressing  Flowsheets (Taken 4/27/2025 2129 by Mikal Allen, RN)  Free From Fall Injury:   Instruct family/caregiver on patient safety   Based on caregiver fall risk screen, instruct family/caregiver to ask for assistance with transferring infant if caregiver noted to have fall risk factors     Problem: Skin/Tissue Integrity - Adult  Goal: Skin integrity remains intact  4/29/2025 0850 by Bing Chang LPN  Outcome: Progressing  Flowsheets (Taken 4/27/2025 2129 by Mikal Allen, RN)  Skin Integrity Remains Intact:   Monitor for areas of redness and/or skin breakdown   Assess vascular access sites hourly   Every 4-6 hours minimum:  Change oxygen saturation probe site   Every 4-6 hours:  If on nasal continuous positive airway pressure, assess nares and determine need for appliance change or resting period   Turn and reposition as indicated   Assess need for specialty bed   Pressure redistribution bed/mattress (bed type)   Positioning devices   Check visual cues for pain     Problem: Genitourinary -

## 2025-04-30 ENCOUNTER — TELEPHONE (OUTPATIENT)
Dept: FAMILY MEDICINE CLINIC | Age: 60
End: 2025-04-30

## 2025-04-30 NOTE — TELEPHONE ENCOUNTER
Care Transitions Initial Follow Up Call    Outreach made within 2 business days of discharge: Yes    Patient: Bhumi Covarrubias Patient : 1965   MRN: [id number]  Reason for Admission: Urinary Retention and Anemia  Discharge Date: 25       Spoke with: L/blair for patient to return call to schedule hospital follow up     Discharge department/facility: [facility] Interactive Patient Contact:  Was patient able to fill all prescriptions:   Was patient instructed to bring all medications to the follow-up visit:   Is patient taking all medications as directed in the discharge summary?   Does patient understand their discharge instructions:   Does patient have questions or concerns that need addressed prior to 7-14 day follow up office visit:     Additional needs identified to be addressed with provider               Scheduled appointment with PCP within 7-14 days    Follow Up  Future Appointments   Date Time Provider Department Center   6/10/2025  9:00 AM Bing Flores APRN - CNP [place] [facility] DE[facility]       Marlen Hewitt MA

## 2025-05-01 LAB — PATH INTERP BLD-IMP: NORMAL

## 2025-05-05 ENCOUNTER — OFFICE VISIT (OUTPATIENT)
Dept: FAMILY MEDICINE CLINIC | Age: 60
End: 2025-05-05
Payer: COMMERCIAL

## 2025-05-05 VITALS
OXYGEN SATURATION: 100 % | WEIGHT: 161 LBS | SYSTOLIC BLOOD PRESSURE: 118 MMHG | HEIGHT: 66 IN | BODY MASS INDEX: 25.88 KG/M2 | DIASTOLIC BLOOD PRESSURE: 68 MMHG | HEART RATE: 84 BPM

## 2025-05-05 DIAGNOSIS — D50.8 OTHER IRON DEFICIENCY ANEMIA: ICD-10-CM

## 2025-05-05 DIAGNOSIS — Z09 HOSPITAL DISCHARGE FOLLOW-UP: ICD-10-CM

## 2025-05-05 DIAGNOSIS — E61.1 LOW SERUM IRON: Primary | ICD-10-CM

## 2025-05-05 DIAGNOSIS — E61.1 LOW SERUM IRON: ICD-10-CM

## 2025-05-05 LAB
FERRITIN SERPL IA-MCNC: 33.6 NG/ML (ref 15–150)
IRON SATN MFR SERPL: 4 % (ref 15–50)
IRON SERPL-MCNC: 17 UG/DL (ref 37–145)
TIBC SERPL-MCNC: 392 UG/DL (ref 260–445)

## 2025-05-05 PROCEDURE — G8419 CALC BMI OUT NRM PARAM NOF/U: HCPCS | Performed by: NURSE PRACTITIONER

## 2025-05-05 PROCEDURE — 3017F COLORECTAL CA SCREEN DOC REV: CPT | Performed by: NURSE PRACTITIONER

## 2025-05-05 PROCEDURE — 99214 OFFICE O/P EST MOD 30 MIN: CPT | Performed by: NURSE PRACTITIONER

## 2025-05-05 PROCEDURE — 1036F TOBACCO NON-USER: CPT | Performed by: NURSE PRACTITIONER

## 2025-05-05 PROCEDURE — G8427 DOCREV CUR MEDS BY ELIG CLIN: HCPCS | Performed by: NURSE PRACTITIONER

## 2025-05-05 PROCEDURE — 1111F DSCHRG MED/CURRENT MED MERGE: CPT | Performed by: NURSE PRACTITIONER

## 2025-05-05 ASSESSMENT — ENCOUNTER SYMPTOMS
SHORTNESS OF BREATH: 0
BLOOD IN STOOL: 0
NAUSEA: 0
CONSTIPATION: 0
DIARRHEA: 0
VOMITING: 0
COLOR CHANGE: 0

## 2025-05-05 NOTE — PROGRESS NOTES
Post-Discharge Transitional Care Management Services or Hospital Follow Up      Bhumi Covarrubias   YOB: 1965    Date of Office Visit:  5/5/2025  Date of Hospital Admission: 4/27/25  Date of Hospital Discharge: 4/29/25  Readmission Risk Score(high >=14%. Medium >=10%):Readmission Risk Score: 6.4    Urinary retention stopped medication in hospital has fu with urology  Symptoms are well   Care management risk score Rising risk (score 2-5) and Complex Care (Scores >=6): No Risk Score On File     Non face to face  following discharge, date last encounter closed (first attempt may have been earlier): 04/30/2025 04/30/2025    Call initiated 2 business days of discharge: Yes     Patient Active Problem List   Diagnosis    Hematuria    Recurrent UTI    Encounter to establish care    Subacute cough    Irritable bowel syndrome    Mixed stress and urge urinary incontinence    Anemia       Allergies   Allergen Reactions    Amoxicillin Hives    Penicillins Hives       Medications listed as ordered at the time of discharge from hospital     Medication List            Accurate as of May 5, 2025  8:49 AM. If you have any questions, ask your nurse or doctor.                CONTINUE taking these medications      Gemtesa 75 MG Tabs tablet  Generic drug: vibegron                Medications marked \"taking\" at this time  No outpatient medications have been marked as taking for the 5/5/25 encounter (Office Visit) with Bing Flores APRN - CNP.        Medications patient taking as of now reconciled against medications ordered at time of hospital discharge: Yes    Chief Complaint   Patient presents with    Follow-Up from Hospital       HPI    Inpatient course: Discharge summary reviewed- see chart.    Interval history/Current status: at home no concerns has returned to work  No urgency frequency or burning in with urination  Hydration not drinking enough reported has 3 bottles a day   Has  fu with GI phone call today

## 2025-05-06 ENCOUNTER — RESULTS FOLLOW-UP (OUTPATIENT)
Dept: FAMILY MEDICINE CLINIC | Age: 60
End: 2025-05-06

## 2025-05-06 LAB
BASOPHILS # BLD: 0.1 K/UL (ref 0–0.2)
BASOPHILS NFR BLD: 1.2 %
DEPRECATED RDW RBC AUTO: 35.1 % (ref 12.4–15.4)
EOSINOPHIL # BLD: 0.1 K/UL (ref 0–0.6)
EOSINOPHIL NFR BLD: 1.9 %
HCT VFR BLD AUTO: 30.1 % (ref 36–48)
HGB BLD-MCNC: 9.2 G/DL (ref 12–16)
LYMPHOCYTES # BLD: 1.5 K/UL (ref 1–5.1)
LYMPHOCYTES NFR BLD: 26.5 %
MCH RBC QN AUTO: 18.8 PG (ref 26–34)
MCHC RBC AUTO-ENTMCNC: 30.4 G/DL (ref 31–36)
MCV RBC AUTO: 61.6 FL (ref 80–100)
MONOCYTES # BLD: 0.5 K/UL (ref 0–1.3)
MONOCYTES NFR BLD: 8.4 %
NEUTROPHILS # BLD: 3.5 K/UL (ref 1.7–7.7)
NEUTROPHILS NFR BLD: 62 %
PATH INTERP BLD-IMP: NO
PLATELET # BLD AUTO: 352 K/UL (ref 135–450)
PMV BLD AUTO: 8.6 FL (ref 5–10.5)
RBC # BLD AUTO: 4.89 M/UL (ref 4–5.2)
WBC # BLD AUTO: 5.6 K/UL (ref 4–11)

## 2025-05-29 NOTE — PROGRESS NOTES
PRE OP INSTRUCTION SHEET   1. Do not eat or drink anything after 12 midnight  prior to surgery. This includes no water, chewing gum or mints.   2. Take the following pills will a small sip of water (see MAR)                                        3. Aspirin, Ibuprofen, Advil, Naproxen, Vitamin E, fish oil and other Anti-inflammatory products should be stopped for 5 days before surgery or as directed by your physician.   4. Check with your Doctor regarding stopping Plavix, Coumadin, Lovenox, Fragmin or other blood thinners   5. Do not smoke, and do not drink any alcoholic beverages 24 hours prior to surgery.  This includes NA Beer.   6. You may brush your teeth and gargle the morning of surgery.  DO NOT SWALLOW WATER   7. You MUST make arrangements for a responsible adult to take you home after your surgery. You will not be allowed to leave alone or drive yourself home.  It is strongly suggested someone stay with you the first 24 hrs. Your surgery will be cancelled if you do not have a ride home.   8. A parent/legal guardian must accompany a child scheduled for surgery and plan to stay at the hospital until the child is discharged.  Please do not bring other children with you.   9. Please wear simple, loose fitting clothing to the hospital.  Do not bring valuables (money, credit cards, checkbooks, etc.) Do not wear any makeup (including no eye makeup) or nail polish on your fingers or toes.   10. DO NOT wear any jewelry or piercings on day of surgery.  All body piercing jewelry must be removed.   11. If you have dentures,glasses, or contacts they will be removed before going to the OR; we will provide you a container.    12. Please see your family doctor/and cardiologist for a history & physical and/or concerning medications.  Bring any test results/reports from your physician's office. Have history and labs faxed to [phone]    13. Remember to bring Blood Bank bracelet on the day of  surgery.   14. If you have a Living Will and Durable Power of  for Healthcare, please bring in a copy.   15. Notify your Surgeon if you develop any illness between now and surgery  time, cough, cold, fever, sore throat, nausea, vomiting, etc.  Please notify your surgeon if you experience dizziness, shortness of breath or blurred vision between now & the time of your surgery   16. DO NOT shave your operative site 96 hours prior to surgery. For face & neck surgery, men may use an electric razor 48 hours prior to surgery.   17. Shower with _x__Antibacterial soap (x_chlorhexidine for total joint  Pt's) shower two times before surgery.(the morning of and the night before.   18. To provide excellent care visitors will be limited to one in the room at any given time.  Please call pre admission testing if you any further questions [phone] or [phone]

## 2025-06-03 ENCOUNTER — ANESTHESIA EVENT (OUTPATIENT)
Dept: OPERATING ROOM | Age: 60
End: 2025-06-03
Payer: COMMERCIAL

## 2025-06-10 ENCOUNTER — OFFICE VISIT (OUTPATIENT)
Dept: FAMILY MEDICINE CLINIC | Age: 60
End: 2025-06-10
Payer: COMMERCIAL

## 2025-06-10 VITALS
OXYGEN SATURATION: 100 % | WEIGHT: 161 LBS | SYSTOLIC BLOOD PRESSURE: 120 MMHG | HEART RATE: 77 BPM | BODY MASS INDEX: 25.88 KG/M2 | HEIGHT: 66 IN | DIASTOLIC BLOOD PRESSURE: 70 MMHG

## 2025-06-10 DIAGNOSIS — Z01.818 PREOP EXAMINATION: Primary | ICD-10-CM

## 2025-06-10 DIAGNOSIS — Z01.818 PRE-OP EXAM: ICD-10-CM

## 2025-06-10 DIAGNOSIS — E61.1 LOW SERUM IRON: ICD-10-CM

## 2025-06-10 PROCEDURE — G8427 DOCREV CUR MEDS BY ELIG CLIN: HCPCS | Performed by: NURSE PRACTITIONER

## 2025-06-10 PROCEDURE — 1036F TOBACCO NON-USER: CPT | Performed by: NURSE PRACTITIONER

## 2025-06-10 PROCEDURE — G8419 CALC BMI OUT NRM PARAM NOF/U: HCPCS | Performed by: NURSE PRACTITIONER

## 2025-06-10 PROCEDURE — 93000 ELECTROCARDIOGRAM COMPLETE: CPT | Performed by: NURSE PRACTITIONER

## 2025-06-10 PROCEDURE — 3017F COLORECTAL CA SCREEN DOC REV: CPT | Performed by: NURSE PRACTITIONER

## 2025-06-10 PROCEDURE — 99213 OFFICE O/P EST LOW 20 MIN: CPT | Performed by: NURSE PRACTITIONER

## 2025-06-10 ASSESSMENT — ENCOUNTER SYMPTOMS
CONSTIPATION: 1
SORE THROAT: 0
BLOOD IN STOOL: 0
SINUS PRESSURE: 1
SINUS PAIN: 0
SHORTNESS OF BREATH: 0
COLOR CHANGE: 0
VOMITING: 0
NAUSEA: 0
DIARRHEA: 0

## 2025-06-10 NOTE — PROGRESS NOTES
Chief Complaint   Patient presents with    Pre-op Exam         Indication: difficulty with urination  Surgery: cystoscopy urtehral dilation bladder biopsy  Surgeon: Dr Dowell  Date: 6/11/2025  History of previous anesthesia complications: na  Agreeable to receive blood products:yes      FUNCTIONAL CAPACITY (Bolded line indicates patient's functional capacity level):  1-3      Due to pain*  Watching television  Eating, dressing, cooking, using the toilet  Walking one or two blocks on level ground at 2-3 miles per hour  Doing light housework (ex dusting)     4-10        Climbing a flight of stairs     Walking on level ground at 4 miles per hour  Running a short distance  Doing heavy chores around the house (ex scrubbing floors, lifting furniture)  Playing moderately strenuous sports (ex golf, dance, bowling)     >10    Playing strenuous sports (ex tennis, basketball)      >10  RISK OF MAJOR CARDIAC COMPLICATIONS FOLLOWING NONCARDIAC SURGERY (Bolded applies to this patient)     Assign one point for each of the following six risk factors:  1 point high risk surgical procedure:   (intraperitoneal, intrathoracic, suprainguinal vascular)   1 point history of MI   history of positive stress test  current chest pain due to myocardial ischemia  current nitrate treatment  Q waves   1 point history of CHF  history of pulmonary edema  history of paroxysmal nocturnal dyspnea  current BL rales  current S3 gallop  current CXR with pulmonary congestion   1 point history of cerebrovascular disease (CVA or TIA)   1 point preoperative tx with insulin   1 point preoperative creatinine >2      Score               Risk Index class          Risk of major cardiac complications (%) (Bolded applies to this patient)  0                      I                                   0.4  1                      II                                  0.9  2                      III                                 6.6  3 or more         IV

## 2025-06-11 ENCOUNTER — ANESTHESIA (OUTPATIENT)
Dept: OPERATING ROOM | Age: 60
End: 2025-06-11
Payer: COMMERCIAL

## 2025-06-11 ENCOUNTER — HOSPITAL ENCOUNTER (OUTPATIENT)
Age: 60
Setting detail: OUTPATIENT SURGERY
Discharge: HOME | End: 2025-06-11
Attending: UROLOGY | Admitting: UROLOGY
Payer: COMMERCIAL

## 2025-06-11 VITALS
WEIGHT: 161 LBS | RESPIRATION RATE: 16 BRPM | HEIGHT: 66 IN | SYSTOLIC BLOOD PRESSURE: 147 MMHG | HEART RATE: 53 BPM | DIASTOLIC BLOOD PRESSURE: 53 MMHG | TEMPERATURE: 98.1 F | BODY MASS INDEX: 25.88 KG/M2 | OXYGEN SATURATION: 99 %

## 2025-06-11 PROCEDURE — 7100000011 HC PHASE II RECOVERY - ADDTL 15 MIN: Performed by: UROLOGY

## 2025-06-11 PROCEDURE — 2500000003 HC RX 250 WO HCPCS: Performed by: UROLOGY

## 2025-06-11 PROCEDURE — 6360000002 HC RX W HCPCS: Performed by: UROLOGY

## 2025-06-11 PROCEDURE — 2580000003 HC RX 258: Performed by: ANESTHESIOLOGY

## 2025-06-11 PROCEDURE — 3600000004 HC SURGERY LEVEL 4 BASE: Performed by: UROLOGY

## 2025-06-11 PROCEDURE — 2500000003 HC RX 250 WO HCPCS: Performed by: ANESTHESIOLOGY

## 2025-06-11 PROCEDURE — 6360000002 HC RX W HCPCS: Performed by: ANESTHESIOLOGY

## 2025-06-11 PROCEDURE — 7100000000 HC PACU RECOVERY - FIRST 15 MIN: Performed by: UROLOGY

## 2025-06-11 PROCEDURE — 2709999900 HC NON-CHARGEABLE SUPPLY: Performed by: UROLOGY

## 2025-06-11 PROCEDURE — 3600000014 HC SURGERY LEVEL 4 ADDTL 15MIN: Performed by: UROLOGY

## 2025-06-11 PROCEDURE — C1769 GUIDE WIRE: HCPCS | Performed by: UROLOGY

## 2025-06-11 PROCEDURE — 3700000001 HC ADD 15 MINUTES (ANESTHESIA): Performed by: UROLOGY

## 2025-06-11 PROCEDURE — 7100000001 HC PACU RECOVERY - ADDTL 15 MIN: Performed by: UROLOGY

## 2025-06-11 PROCEDURE — 6370000000 HC RX 637 (ALT 250 FOR IP): Performed by: UROLOGY

## 2025-06-11 PROCEDURE — 6360000002 HC RX W HCPCS

## 2025-06-11 PROCEDURE — 2580000003 HC RX 258: Performed by: UROLOGY

## 2025-06-11 PROCEDURE — 7100000010 HC PHASE II RECOVERY - FIRST 15 MIN: Performed by: UROLOGY

## 2025-06-11 PROCEDURE — 3700000000 HC ANESTHESIA ATTENDED CARE: Performed by: UROLOGY

## 2025-06-11 RX ORDER — OXYCODONE HYDROCHLORIDE 5 MG/1
5 TABLET ORAL PRN
Status: DISCONTINUED | OUTPATIENT
Start: 2025-06-11 | End: 2025-06-11 | Stop reason: HOSPADM

## 2025-06-11 RX ORDER — SULFAMETHOXAZOLE AND TRIMETHOPRIM 400; 80 MG/1; MG/1
1 TABLET ORAL 2 TIMES DAILY
Qty: 8 TABLET | Refills: 0 | Status: SHIPPED | OUTPATIENT
Start: 2025-06-11 | End: 2025-06-15

## 2025-06-11 RX ORDER — LIDOCAINE HYDROCHLORIDE 20 MG/ML
JELLY TOPICAL PRN
Status: DISCONTINUED | OUTPATIENT
Start: 2025-06-11 | End: 2025-06-11 | Stop reason: ALTCHOICE

## 2025-06-11 RX ORDER — PHENAZOPYRIDINE HYDROCHLORIDE 200 MG/1
200 TABLET, FILM COATED ORAL 3 TIMES DAILY PRN
Qty: 15 TABLET | Refills: 0 | Status: SHIPPED | OUTPATIENT
Start: 2025-06-11 | End: 2025-06-16

## 2025-06-11 RX ORDER — SODIUM CHLORIDE, SODIUM LACTATE, POTASSIUM CHLORIDE, CALCIUM CHLORIDE 600; 310; 30; 20 MG/100ML; MG/100ML; MG/100ML; MG/100ML
INJECTION, SOLUTION INTRAVENOUS CONTINUOUS
Status: DISCONTINUED | OUTPATIENT
Start: 2025-06-11 | End: 2025-06-11 | Stop reason: HOSPADM

## 2025-06-11 RX ORDER — NALOXONE HYDROCHLORIDE 0.4 MG/ML
INJECTION, SOLUTION INTRAMUSCULAR; INTRAVENOUS; SUBCUTANEOUS PRN
Status: DISCONTINUED | OUTPATIENT
Start: 2025-06-11 | End: 2025-06-11 | Stop reason: HOSPADM

## 2025-06-11 RX ORDER — SODIUM CHLORIDE 9 MG/ML
INJECTION, SOLUTION INTRAVENOUS PRN
Status: DISCONTINUED | OUTPATIENT
Start: 2025-06-11 | End: 2025-06-11 | Stop reason: HOSPADM

## 2025-06-11 RX ORDER — LABETALOL HYDROCHLORIDE 5 MG/ML
5 INJECTION, SOLUTION INTRAVENOUS EVERY 10 MIN PRN
Status: DISCONTINUED | OUTPATIENT
Start: 2025-06-11 | End: 2025-06-11 | Stop reason: HOSPADM

## 2025-06-11 RX ORDER — PROPOFOL 10 MG/ML
INJECTION, EMULSION INTRAVENOUS
Status: DISCONTINUED | OUTPATIENT
Start: 2025-06-11 | End: 2025-06-11 | Stop reason: SDUPTHER

## 2025-06-11 RX ORDER — SODIUM CHLORIDE 0.9 % (FLUSH) 0.9 %
5-40 SYRINGE (ML) INJECTION PRN
Status: DISCONTINUED | OUTPATIENT
Start: 2025-06-11 | End: 2025-06-11 | Stop reason: HOSPADM

## 2025-06-11 RX ORDER — SODIUM CHLORIDE 0.9 % (FLUSH) 0.9 %
5-40 SYRINGE (ML) INJECTION EVERY 12 HOURS SCHEDULED
Status: DISCONTINUED | OUTPATIENT
Start: 2025-06-11 | End: 2025-06-11 | Stop reason: HOSPADM

## 2025-06-11 RX ORDER — DEXAMETHASONE SODIUM PHOSPHATE 4 MG/ML
INJECTION, SOLUTION INTRA-ARTICULAR; INTRALESIONAL; INTRAMUSCULAR; INTRAVENOUS; SOFT TISSUE
Status: DISCONTINUED | OUTPATIENT
Start: 2025-06-11 | End: 2025-06-11 | Stop reason: SDUPTHER

## 2025-06-11 RX ORDER — DIPHENHYDRAMINE HYDROCHLORIDE 50 MG/ML
12.5 INJECTION, SOLUTION INTRAMUSCULAR; INTRAVENOUS
Status: DISCONTINUED | OUTPATIENT
Start: 2025-06-11 | End: 2025-06-11 | Stop reason: HOSPADM

## 2025-06-11 RX ORDER — MEPERIDINE HYDROCHLORIDE 25 MG/ML
12.5 INJECTION INTRAMUSCULAR; INTRAVENOUS; SUBCUTANEOUS EVERY 5 MIN PRN
Status: DISCONTINUED | OUTPATIENT
Start: 2025-06-11 | End: 2025-06-11 | Stop reason: HOSPADM

## 2025-06-11 RX ORDER — ONDANSETRON 2 MG/ML
INJECTION INTRAMUSCULAR; INTRAVENOUS
Status: DISCONTINUED | OUTPATIENT
Start: 2025-06-11 | End: 2025-06-11 | Stop reason: SDUPTHER

## 2025-06-11 RX ORDER — LIDOCAINE HYDROCHLORIDE 20 MG/ML
INJECTION, SOLUTION INFILTRATION; PERINEURAL
Status: DISCONTINUED | OUTPATIENT
Start: 2025-06-11 | End: 2025-06-11 | Stop reason: SDUPTHER

## 2025-06-11 RX ORDER — FENTANYL CITRATE 50 UG/ML
INJECTION, SOLUTION INTRAMUSCULAR; INTRAVENOUS
Status: DISCONTINUED | OUTPATIENT
Start: 2025-06-11 | End: 2025-06-11 | Stop reason: SDUPTHER

## 2025-06-11 RX ORDER — ONDANSETRON 2 MG/ML
4 INJECTION INTRAMUSCULAR; INTRAVENOUS
Status: DISCONTINUED | OUTPATIENT
Start: 2025-06-11 | End: 2025-06-11 | Stop reason: HOSPADM

## 2025-06-11 RX ORDER — OXYCODONE HYDROCHLORIDE 5 MG/1
10 TABLET ORAL PRN
Status: DISCONTINUED | OUTPATIENT
Start: 2025-06-11 | End: 2025-06-11 | Stop reason: HOSPADM

## 2025-06-11 RX ADMIN — ONDANSETRON 4 MG: 2 INJECTION INTRAMUSCULAR; INTRAVENOUS at 14:36

## 2025-06-11 RX ADMIN — DEXAMETHASONE SODIUM PHOSPHATE 4 MG: 4 INJECTION, SOLUTION INTRAMUSCULAR; INTRAVENOUS at 14:36

## 2025-06-11 RX ADMIN — SODIUM CHLORIDE 2000 MG: 9 INJECTION, SOLUTION INTRAVENOUS at 14:31

## 2025-06-11 RX ADMIN — SODIUM CHLORIDE, POTASSIUM CHLORIDE, SODIUM LACTATE AND CALCIUM CHLORIDE: 600; 310; 30; 20 INJECTION, SOLUTION INTRAVENOUS at 14:25

## 2025-06-11 RX ADMIN — LIDOCAINE HYDROCHLORIDE 60 MG: 20 INJECTION, SOLUTION INFILTRATION; PERINEURAL at 14:28

## 2025-06-11 RX ADMIN — PROPOFOL 200 MG: 10 INJECTION, EMULSION INTRAVENOUS at 14:28

## 2025-06-11 RX ADMIN — FAMOTIDINE 20 MG: 10 INJECTION, SOLUTION INTRAVENOUS at 14:34

## 2025-06-11 RX ADMIN — FENTANYL CITRATE 50 MCG: 50 INJECTION INTRAMUSCULAR; INTRAVENOUS at 14:27

## 2025-06-11 ASSESSMENT — PAIN - FUNCTIONAL ASSESSMENT: PAIN_FUNCTIONAL_ASSESSMENT: NONE - DENIES PAIN

## 2025-06-11 NOTE — BRIEF OP NOTE
Brief Postoperative Note      Patient: Bhumi Covarrubias  YOB: 1965  MRN: [id number]    Date of Procedure: 6/11/2025    Pre-Op Diagnosis Codes:      * Retention of urine, unspecified [R33.9]    Post-Op Diagnosis: Same       Procedure(s):  CYSTOSCOPY, URETHRAL DILATION    Surgeon(s):  Santi Dowell MD    Assistant:  * No surgical staff found *    Anesthesia: General    Estimated Blood Loss (mL): Minimal    Complications: None    Specimens:   * No specimens in log *    Implants:  * No implants in log *      Drains: * No LDAs found *    Findings:  Infection Present At Time Of Surgery (PATOS) (choose all levels that have infection present):  No infection present  Other Findings: Stenosis, no TCC tumors or stones.    Electronically signed by Santi Dowell MD on 6/11/2025 at 2:42 PM

## 2025-06-11 NOTE — ANESTHESIA POSTPROCEDURE EVALUATION
04/29/2025 05:01 AM        BUN                      10                  04/29/2025 05:01 AM        CREATININE               0.7                 04/29/2025 05:01 AM        GLUCOSE                  96                  04/29/2025 05:01 AM   COAGS  Lab Results       Component                Value               Date/Time                  PROTIME                  14.3                04/27/2025 12:56 PM        INR                      1.09                04/27/2025 12:56 PM     Intake & Output:  @24HRIO@    Nausea & Vomiting:  No    Level of Consciousness:  Awake    Pain Assessment:  Adequate analgesia    Anesthesia Complications:  No apparent anesthetic complications    SUMMARY      Vital signs stable  OK to discharge from Stage I post anesthesia care.  Care transferred from Anesthesiology department on discharge from perioperative area     No notable events documented.

## 2025-06-11 NOTE — DISCHARGE INSTRUCTIONS
Patient to call Dr. Dowell's office for a follow up appointment in 4-6 weeks.  Office number to call is [phone].    ANESTHESIA DISCHARGE INSTRUCTIONS    You are under the influence of drugs- do not drink alcohol, drive a car, operate machinery(such as power tools, kitchen appliances, etc), sign legal documents, or make any important decisions for 24 hours (or while on pain medications).   Children should not ride bikes or skate boards or play on gym sets  for 24 hours after surgery.  A responsible adult should be with you for 24 hours.  Rest at home today- increase activity as tolerated.  Progress slowly to a regular diet unless your physician has instructed you otherwise. Drink plenty of water.    CALL YOUR DOCTOR IF YOU:  Have moderate to severe nausea or vomiting AND are unable to hold down fluids or prescribed medications.  Have bright red bloody drainage from your dressing that won't stop oozing.  Do not get relief with your pain medication    NORMAL (POSSIBLE) SIDE EFFECTS FROM ANESTHESIA:     Confusion, temporary memory loss, delayed reaction times in the first 24 hours  Lightheadedness, dizziness, difficulty focusing, blurred vision  Nausea/vomiting can happen  Shivering, feeling cold, sore throat, cough and muscle aches should stop within 24-48 hours  Trouble urinating - call your surgeon if it has been more than 8 hrs  Bruising or soreness at the IV site - call if it remains red, firm or there is drainage             FEMALES OF CHILDBEARING AGE WHO ARE TAKING BIRTH CONTROL PILLS:  You may have received a medication during your procedure that interferes with the   actions of birth control pills (Bridion or Emend). Use some other kind of birth control in addition to your pills, like a condom, for 1 month after your procedure to prevent unwanted pregnancy.    The following instructions are to be followed if you have a known history or diagnosis of sleep apnea:  For all sleep apnea patients:  ? Sleep on

## 2025-06-11 NOTE — ANESTHESIA PRE PROCEDURE
Anesthesia Plan      MAC     ASA 2                                   Tammie Bess MD   6/11/2025

## 2025-06-16 NOTE — OP NOTE
[address] [address]                            OPERATIVE REPORT      PATIENT NAME: KEENA YIN            : 1965  MED REC NO: [id number]                      ROOM: OR [place]  ACCOUNT NO: [id number]                       ADMIT DATE: 2025  PROVIDER: Santi Dowell MD      DATE OF PROCEDURE:  2025    SURGEON:  Santi Dowell MD    PREOPERATIVE DIAGNOSES:    1. Urethral stenosis.  2. Outlet obstruction.  3. Chronic cystitis.  4. Hematuria.    POSTOPERATIVE DIAGNOSES:    1. Urethral stenosis.  2. Outlet obstruction.  3. Chronic cystitis.  4. Hematuria.    OPERATION PERFORMED:  Cystoscopy with urethral dilation.    ANESTHESIA:  General.    ESTIMATED BLOOD LOSS:  2 mL.    OPERATIVE FINDINGS:    1. Urethral stenosis.  2. Diffusely trabeculated bladder.  3. Adequate bladder support, no evidence for prolapse.  4. No evidence for bladder cancer, tumors, or stones.    HISTORY AND INDICATIONS:  59-year-old white female with a history of voiding issues, retention, and chronic cystitis.  She had been failing medical management.  Options were discussed, and she elected to proceed forth with today's procedure.  Risks, benefits, and expected outcomes of the procedure were discussed in preoperative consultation.    DETAILS OF THE PROCEDURE:  After obtaining informed consent, the patient was taken to the operative suite.  She was given a general anesthetic and placed on the operative table in a modified dorsal lithotomy position.  Prepping and draping was done in a sterile fashion.  Cystourethroscopy was then performed with both 30- and 70-degree lenses.  This showed the bladder to be intact.  There was good support.  Stenosis of the patient's urethra and bladder neck area were easily encountered.  Both ureteral orifices were also identified and found to be in normal anatomic position with clear efflux of

## 2025-07-10 PROBLEM — Z01.818 PREOP EXAMINATION: Status: RESOLVED | Noted: 2025-06-10 | Resolved: 2025-07-10

## 2025-08-15 ENCOUNTER — OFFICE VISIT (OUTPATIENT)
Dept: FAMILY MEDICINE CLINIC | Age: 60
End: 2025-08-15
Payer: COMMERCIAL

## 2025-08-15 VITALS
OXYGEN SATURATION: 100 % | DIASTOLIC BLOOD PRESSURE: 70 MMHG | HEIGHT: 66 IN | HEART RATE: 99 BPM | BODY MASS INDEX: 26.03 KG/M2 | WEIGHT: 162 LBS | SYSTOLIC BLOOD PRESSURE: 136 MMHG

## 2025-08-15 DIAGNOSIS — R42 VERTIGO: Primary | ICD-10-CM

## 2025-08-15 DIAGNOSIS — H93.19 TINNITUS, UNSPECIFIED LATERALITY: ICD-10-CM

## 2025-08-15 PROCEDURE — 1036F TOBACCO NON-USER: CPT | Performed by: NURSE PRACTITIONER

## 2025-08-15 PROCEDURE — 99214 OFFICE O/P EST MOD 30 MIN: CPT | Performed by: NURSE PRACTITIONER

## 2025-08-15 PROCEDURE — G8419 CALC BMI OUT NRM PARAM NOF/U: HCPCS | Performed by: NURSE PRACTITIONER

## 2025-08-15 PROCEDURE — G8427 DOCREV CUR MEDS BY ELIG CLIN: HCPCS | Performed by: NURSE PRACTITIONER

## 2025-08-15 PROCEDURE — 3017F COLORECTAL CA SCREEN DOC REV: CPT | Performed by: NURSE PRACTITIONER

## 2025-08-15 RX ORDER — MECLIZINE HYDROCHLORIDE 25 MG/1
25 TABLET ORAL 3 TIMES DAILY PRN
Qty: 30 TABLET | Refills: 0 | Status: SHIPPED | OUTPATIENT
Start: 2025-08-15 | End: 2025-08-25

## 2025-08-20 ENCOUNTER — TELEPHONE (OUTPATIENT)
Dept: FAMILY MEDICINE CLINIC | Age: 60
End: 2025-08-20

## 2025-08-22 ENCOUNTER — HOSPITAL ENCOUNTER (EMERGENCY)
Age: 60
Discharge: HOME | End: 2025-08-22
Attending: STUDENT IN AN ORGANIZED HEALTH CARE EDUCATION/TRAINING PROGRAM
Payer: COMMERCIAL

## 2025-08-22 ENCOUNTER — APPOINTMENT (OUTPATIENT)
Dept: CT IMAGING | Age: 60
End: 2025-08-22
Attending: STUDENT IN AN ORGANIZED HEALTH CARE EDUCATION/TRAINING PROGRAM
Payer: COMMERCIAL

## 2025-08-22 VITALS
HEIGHT: 66 IN | SYSTOLIC BLOOD PRESSURE: 131 MMHG | DIASTOLIC BLOOD PRESSURE: 83 MMHG | RESPIRATION RATE: 12 BRPM | WEIGHT: 162.4 LBS | OXYGEN SATURATION: 98 % | BODY MASS INDEX: 26.1 KG/M2 | TEMPERATURE: 98.3 F | HEART RATE: 70 BPM

## 2025-08-22 DIAGNOSIS — H92.01 RIGHT EAR PAIN: ICD-10-CM

## 2025-08-22 DIAGNOSIS — R42 VERTIGO: Primary | ICD-10-CM

## 2025-08-22 LAB
ABO/RH: NORMAL
ALBUMIN SERPL-MCNC: 4.3 G/DL (ref 3.4–5)
ALBUMIN/GLOB SERPL: 1.7 {RATIO} (ref 1.1–2.2)
ALP SERPL-CCNC: 47 U/L (ref 40–129)
ALT SERPL-CCNC: 8 U/L (ref 10–40)
ANION GAP SERPL CALCULATED.3IONS-SCNC: 9 MMOL/L (ref 3–16)
ANTIBODY SCREEN: NORMAL
AST SERPL-CCNC: 16 U/L (ref 15–37)
BASOPHILS # BLD: 0 K/UL (ref 0–0.2)
BASOPHILS NFR BLD: 0.8 %
BILIRUB SERPL-MCNC: 0.3 MG/DL (ref 0–1)
BILIRUB UR QL STRIP.AUTO: NEGATIVE
BUN SERPL-MCNC: 9 MG/DL (ref 7–20)
CALCIUM SERPL-MCNC: 9 MG/DL (ref 8.3–10.6)
CHLORIDE SERPL-SCNC: 102 MMOL/L (ref 99–110)
CLARITY UR: CLEAR
CO2 SERPL-SCNC: 26 MMOL/L (ref 21–32)
COLOR UR: YELLOW
CREAT SERPL-MCNC: 0.8 MG/DL (ref 0.6–1.2)
DEPRECATED RDW RBC AUTO: 18.5 % (ref 12.4–15.4)
EKG ATRIAL RATE: 73 BPM
EKG DIAGNOSIS: NORMAL
EKG P AXIS: 43 DEGREES
EKG P-R INTERVAL: 188 MS
EKG Q-T INTERVAL: 394 MS
EKG QRS DURATION: 86 MS
EKG QTC CALCULATION (BAZETT): 434 MS
EKG R AXIS: 25 DEGREES
EKG T AXIS: 44 DEGREES
EKG VENTRICULAR RATE: 73 BPM
EOSINOPHIL # BLD: 0.1 K/UL (ref 0–0.6)
EOSINOPHIL NFR BLD: 1.3 %
GFR SERPLBLD CREATININE-BSD FMLA CKD-EPI: 83 ML/MIN/{1.73_M2}
GLUCOSE SERPL-MCNC: 84 MG/DL (ref 70–99)
GLUCOSE UR STRIP.AUTO-MCNC: NEGATIVE MG/DL
HCT VFR BLD AUTO: 39.6 % (ref 36–48)
HGB BLD-MCNC: 13.2 G/DL (ref 12–16)
HGB UR QL STRIP.AUTO: NEGATIVE
KETONES UR STRIP.AUTO-MCNC: NEGATIVE MG/DL
LEUKOCYTE ESTERASE UR QL STRIP.AUTO: NEGATIVE
LYMPHOCYTES # BLD: 1.7 K/UL (ref 1–5.1)
LYMPHOCYTES NFR BLD: 28.7 %
MCH RBC QN AUTO: 27.9 PG (ref 26–34)
MCHC RBC AUTO-ENTMCNC: 33.3 G/DL (ref 31–36)
MCV RBC AUTO: 83.9 FL (ref 80–100)
MONOCYTES # BLD: 0.4 K/UL (ref 0–1.3)
MONOCYTES NFR BLD: 6.8 %
NEUTROPHILS # BLD: 3.7 K/UL (ref 1.7–7.7)
NEUTROPHILS NFR BLD: 62.4 %
NITRITE UR QL STRIP.AUTO: NEGATIVE
PH UR STRIP.AUTO: 6.5 [PH] (ref 5–8)
PLATELET # BLD AUTO: 291 K/UL (ref 135–450)
PMV BLD AUTO: 7.4 FL (ref 5–10.5)
POTASSIUM SERPL-SCNC: 4.3 MMOL/L (ref 3.5–5.1)
PROT SERPL-MCNC: 6.8 G/DL (ref 6.4–8.2)
PROT UR STRIP.AUTO-MCNC: NEGATIVE MG/DL
RBC # BLD AUTO: 4.73 M/UL (ref 4–5.2)
SODIUM SERPL-SCNC: 137 MMOL/L (ref 136–145)
SP GR UR STRIP.AUTO: 1.01 (ref 1–1.03)
UA COMPLETE W REFLEX CULTURE PNL UR: NORMAL
UA DIPSTICK W REFLEX MICRO PNL UR: NORMAL
URN SPEC COLLECT METH UR: NORMAL
UROBILINOGEN UR STRIP-ACNC: 0.2 E.U./DL
WBC # BLD AUTO: 5.9 K/UL (ref 4–11)

## 2025-08-22 PROCEDURE — 81003 URINALYSIS AUTO W/O SCOPE: CPT

## 2025-08-22 PROCEDURE — 85025 COMPLETE CBC W/AUTO DIFF WBC: CPT

## 2025-08-22 PROCEDURE — 86850 RBC ANTIBODY SCREEN: CPT

## 2025-08-22 PROCEDURE — 99284 EMERGENCY DEPT VISIT MOD MDM: CPT

## 2025-08-22 PROCEDURE — 80053 COMPREHEN METABOLIC PANEL: CPT

## 2025-08-22 PROCEDURE — 86901 BLOOD TYPING SEROLOGIC RH(D): CPT

## 2025-08-22 PROCEDURE — 36415 COLL VENOUS BLD VENIPUNCTURE: CPT

## 2025-08-22 PROCEDURE — 93005 ELECTROCARDIOGRAM TRACING: CPT | Performed by: STUDENT IN AN ORGANIZED HEALTH CARE EDUCATION/TRAINING PROGRAM

## 2025-08-22 PROCEDURE — 86900 BLOOD TYPING SEROLOGIC ABO: CPT

## 2025-08-22 PROCEDURE — 70450 CT HEAD/BRAIN W/O DYE: CPT

## 2025-08-22 RX ORDER — AZITHROMYCIN 250 MG/1
TABLET, FILM COATED ORAL
Qty: 6 TABLET | Refills: 0 | Status: SHIPPED | OUTPATIENT
Start: 2025-08-22 | End: 2025-08-27 | Stop reason: ALTCHOICE

## 2025-08-22 ASSESSMENT — ENCOUNTER SYMPTOMS
RHINORRHEA: 0
SHORTNESS OF BREATH: 0
BACK PAIN: 0
COUGH: 0
SORE THROAT: 0
NAUSEA: 0
VOMITING: 0
DIARRHEA: 0
ABDOMINAL PAIN: 0

## 2025-08-22 ASSESSMENT — LIFESTYLE VARIABLES
HOW OFTEN DO YOU HAVE A DRINK CONTAINING ALCOHOL: NEVER
HOW MANY STANDARD DRINKS CONTAINING ALCOHOL DO YOU HAVE ON A TYPICAL DAY: PATIENT DOES NOT DRINK

## 2025-08-22 ASSESSMENT — PAIN SCALES - GENERAL: PAINLEVEL_OUTOF10: 0

## 2025-08-22 ASSESSMENT — PAIN - FUNCTIONAL ASSESSMENT: PAIN_FUNCTIONAL_ASSESSMENT: 0-10

## 2025-08-27 ENCOUNTER — OFFICE VISIT (OUTPATIENT)
Dept: FAMILY MEDICINE CLINIC | Age: 60
End: 2025-08-27

## 2025-08-27 ENCOUNTER — PROCEDURE VISIT (OUTPATIENT)
Dept: AUDIOLOGY | Age: 60
End: 2025-08-27
Payer: COMMERCIAL

## 2025-08-27 ENCOUNTER — TELEMEDICINE (OUTPATIENT)
Dept: FAMILY MEDICINE CLINIC | Age: 60
End: 2025-08-27
Payer: COMMERCIAL

## 2025-08-27 VITALS
DIASTOLIC BLOOD PRESSURE: 84 MMHG | SYSTOLIC BLOOD PRESSURE: 134 MMHG | WEIGHT: 161 LBS | HEART RATE: 62 BPM | OXYGEN SATURATION: 98 % | BODY MASS INDEX: 25.88 KG/M2 | TEMPERATURE: 98.2 F | HEIGHT: 66 IN

## 2025-08-27 DIAGNOSIS — H93.13 TINNITUS OF BOTH EARS: ICD-10-CM

## 2025-08-27 DIAGNOSIS — J02.0 ACUTE STREPTOCOCCAL PHARYNGITIS: Primary | ICD-10-CM

## 2025-08-27 DIAGNOSIS — H90.3 SENSORINEURAL HEARING LOSS (SNHL) OF BOTH EARS: Primary | ICD-10-CM

## 2025-08-27 DIAGNOSIS — R13.10 DYSPHAGIA, UNSPECIFIED TYPE: Primary | ICD-10-CM

## 2025-08-27 DIAGNOSIS — H92.01 RIGHT EAR PAIN: ICD-10-CM

## 2025-08-27 DIAGNOSIS — R42 DIZZINESS: ICD-10-CM

## 2025-08-27 PROCEDURE — G8427 DOCREV CUR MEDS BY ELIG CLIN: HCPCS | Performed by: NURSE PRACTITIONER

## 2025-08-27 PROCEDURE — 92557 COMPREHENSIVE HEARING TEST: CPT

## 2025-08-27 PROCEDURE — 99213 OFFICE O/P EST LOW 20 MIN: CPT | Performed by: NURSE PRACTITIONER

## 2025-08-27 PROCEDURE — 1036F TOBACCO NON-USER: CPT | Performed by: NURSE PRACTITIONER

## 2025-08-27 PROCEDURE — 3017F COLORECTAL CA SCREEN DOC REV: CPT | Performed by: NURSE PRACTITIONER

## 2025-08-27 PROCEDURE — G8419 CALC BMI OUT NRM PARAM NOF/U: HCPCS | Performed by: NURSE PRACTITIONER

## 2025-08-27 PROCEDURE — G8428 CUR MEDS NOT DOCUMENT: HCPCS

## 2025-08-27 PROCEDURE — 92567 TYMPANOMETRY: CPT

## 2025-08-27 RX ORDER — CEPHALEXIN 500 MG/1
500 CAPSULE ORAL 2 TIMES DAILY
COMMUNITY
Start: 2025-08-25

## 2025-08-27 RX ORDER — IBUPROFEN 800 MG/1
800 TABLET, FILM COATED ORAL EVERY 8 HOURS PRN
Qty: 15 TABLET | Refills: 0 | Status: SHIPPED | OUTPATIENT
Start: 2025-08-27 | End: 2025-09-01

## 2025-08-27 ASSESSMENT — ENCOUNTER SYMPTOMS
SORE THROAT: 1
VOMITING: 0
NAUSEA: 0
SINUS PRESSURE: 1
SINUS PAIN: 1
CONSTIPATION: 0
COUGH: 0
SHORTNESS OF BREATH: 0
DIARRHEA: 0

## (undated) DEVICE — GLOVE ORANGE PI 7   MSG9070

## (undated) DEVICE — GUIDEWIRE UROLOGICAL STR STD 0.035 IN X150 CM (QTY = BOX OF 5)

## (undated) DEVICE — MHCZ CYSTO: Brand: MEDLINE INDUSTRIES, INC.

## (undated) DEVICE — CANNULA NSL 13FT TUBE AD ETCO2 DIV SAMP M

## (undated) DEVICE — SOLUTION IRRIGATION STRL H2O 1000 ML UROMATIC CONTAINER

## (undated) DEVICE — BAG URIN STRL FOR URO CTCH SYS